# Patient Record
Sex: MALE | Race: WHITE | Employment: OTHER | URBAN - METROPOLITAN AREA
[De-identification: names, ages, dates, MRNs, and addresses within clinical notes are randomized per-mention and may not be internally consistent; named-entity substitution may affect disease eponyms.]

---

## 2021-08-10 ENCOUNTER — HOSPITAL ENCOUNTER (INPATIENT)
Age: 66
LOS: 1 days | Discharge: HOME | DRG: 607 | End: 2021-08-12
Attending: EMERGENCY MEDICINE | Admitting: INTERNAL MEDICINE
Payer: MEDICARE

## 2021-08-10 DIAGNOSIS — R21 RASH AND OTHER NONSPECIFIC SKIN ERUPTION: Primary | ICD-10-CM

## 2021-08-10 DIAGNOSIS — T78.40XA ALLERGIC REACTION TO DRUG, INITIAL ENCOUNTER: ICD-10-CM

## 2021-08-10 LAB
ALBUMIN SERPL-MCNC: 4.1 G/DL (ref 3.5–5.2)
ALP BLD-CCNC: 119 U/L (ref 40–129)
ALT SERPL-CCNC: 29 U/L (ref 0–40)
ANION GAP SERPL CALCULATED.3IONS-SCNC: 9 MMOL/L (ref 7–16)
AST SERPL-CCNC: 22 U/L (ref 0–39)
BASOPHILS ABSOLUTE: 0.04 E9/L (ref 0–0.2)
BASOPHILS RELATIVE PERCENT: 0.5 % (ref 0–2)
BILIRUB SERPL-MCNC: 0.4 MG/DL (ref 0–1.2)
BILIRUBIN URINE: NEGATIVE
BLOOD, URINE: NEGATIVE
BUN BLDV-MCNC: 26 MG/DL (ref 6–23)
CALCIUM SERPL-MCNC: 9.1 MG/DL (ref 8.6–10.2)
CHLORIDE BLD-SCNC: 100 MMOL/L (ref 98–107)
CLARITY: CLEAR
CO2: 29 MMOL/L (ref 22–29)
COLOR: YELLOW
CREAT SERPL-MCNC: 1.1 MG/DL (ref 0.7–1.2)
EOSINOPHILS ABSOLUTE: 0.6 E9/L (ref 0.05–0.5)
EOSINOPHILS RELATIVE PERCENT: 6.8 % (ref 0–6)
GFR AFRICAN AMERICAN: >60
GFR NON-AFRICAN AMERICAN: >60 ML/MIN/1.73
GLUCOSE BLD-MCNC: 101 MG/DL (ref 74–99)
GLUCOSE URINE: NEGATIVE MG/DL
HCT VFR BLD CALC: 46.1 % (ref 37–54)
HEMOGLOBIN: 15.2 G/DL (ref 12.5–16.5)
IMMATURE GRANULOCYTES #: 0.1 E9/L
IMMATURE GRANULOCYTES %: 1.1 % (ref 0–5)
INR BLD: 0.9
KETONES, URINE: NEGATIVE MG/DL
LACTIC ACID: 1 MMOL/L (ref 0.5–2.2)
LEUKOCYTE ESTERASE, URINE: NEGATIVE
LIPASE: 24 U/L (ref 13–60)
LYMPHOCYTES ABSOLUTE: 1.24 E9/L (ref 1.5–4)
LYMPHOCYTES RELATIVE PERCENT: 14.1 % (ref 20–42)
MCH RBC QN AUTO: 32.8 PG (ref 26–35)
MCHC RBC AUTO-ENTMCNC: 33 % (ref 32–34.5)
MCV RBC AUTO: 99.6 FL (ref 80–99.9)
MONOCYTES ABSOLUTE: 0.89 E9/L (ref 0.1–0.95)
MONOCYTES RELATIVE PERCENT: 10.1 % (ref 2–12)
NEUTROPHILS ABSOLUTE: 5.94 E9/L (ref 1.8–7.3)
NEUTROPHILS RELATIVE PERCENT: 67.4 % (ref 43–80)
NITRITE, URINE: NEGATIVE
PDW BLD-RTO: 13.4 FL (ref 11.5–15)
PH UA: 5.5 (ref 5–9)
PLATELET # BLD: 193 E9/L (ref 130–450)
PMV BLD AUTO: 9.7 FL (ref 7–12)
POTASSIUM REFLEX MAGNESIUM: 4.2 MMOL/L (ref 3.5–5)
PROTEIN UA: NEGATIVE MG/DL
PROTHROMBIN TIME: 10.2 SEC (ref 9.3–12.4)
RBC # BLD: 4.63 E12/L (ref 3.8–5.8)
SARS-COV-2, NAAT: NOT DETECTED
SEDIMENTATION RATE, ERYTHROCYTE: 2 MM/HR (ref 0–15)
SODIUM BLD-SCNC: 138 MMOL/L (ref 132–146)
SPECIFIC GRAVITY UA: 1.01 (ref 1–1.03)
TOTAL PROTEIN: 7.1 G/DL (ref 6.4–8.3)
UROBILINOGEN, URINE: 0.2 E.U./DL
WBC # BLD: 8.8 E9/L (ref 4.5–11.5)

## 2021-08-10 PROCEDURE — 96375 TX/PRO/DX INJ NEW DRUG ADDON: CPT

## 2021-08-10 PROCEDURE — 6360000002 HC RX W HCPCS: Performed by: PHYSICIAN ASSISTANT

## 2021-08-10 PROCEDURE — 93005 ELECTROCARDIOGRAM TRACING: CPT | Performed by: EMERGENCY MEDICINE

## 2021-08-10 PROCEDURE — 83690 ASSAY OF LIPASE: CPT

## 2021-08-10 PROCEDURE — G0378 HOSPITAL OBSERVATION PER HR: HCPCS

## 2021-08-10 PROCEDURE — 2500000003 HC RX 250 WO HCPCS: Performed by: PHYSICIAN ASSISTANT

## 2021-08-10 PROCEDURE — 83605 ASSAY OF LACTIC ACID: CPT

## 2021-08-10 PROCEDURE — 81003 URINALYSIS AUTO W/O SCOPE: CPT

## 2021-08-10 PROCEDURE — 87088 URINE BACTERIA CULTURE: CPT

## 2021-08-10 PROCEDURE — 6360000002 HC RX W HCPCS: Performed by: EMERGENCY MEDICINE

## 2021-08-10 PROCEDURE — 99282 EMERGENCY DEPT VISIT SF MDM: CPT

## 2021-08-10 PROCEDURE — 87040 BLOOD CULTURE FOR BACTERIA: CPT

## 2021-08-10 PROCEDURE — 2500000003 HC RX 250 WO HCPCS: Performed by: EMERGENCY MEDICINE

## 2021-08-10 PROCEDURE — 96374 THER/PROPH/DIAG INJ IV PUSH: CPT

## 2021-08-10 PROCEDURE — 96376 TX/PRO/DX INJ SAME DRUG ADON: CPT

## 2021-08-10 PROCEDURE — 80053 COMPREHEN METABOLIC PANEL: CPT

## 2021-08-10 PROCEDURE — 85025 COMPLETE CBC W/AUTO DIFF WBC: CPT

## 2021-08-10 PROCEDURE — 87635 SARS-COV-2 COVID-19 AMP PRB: CPT

## 2021-08-10 PROCEDURE — 85651 RBC SED RATE NONAUTOMATED: CPT

## 2021-08-10 PROCEDURE — 85610 PROTHROMBIN TIME: CPT

## 2021-08-10 RX ORDER — ACETAMINOPHEN 325 MG/1
650 TABLET ORAL EVERY 6 HOURS PRN
Status: DISCONTINUED | OUTPATIENT
Start: 2021-08-10 | End: 2021-08-12 | Stop reason: HOSPADM

## 2021-08-10 RX ORDER — METHYLPREDNISOLONE SODIUM SUCCINATE 125 MG/2ML
60 INJECTION, POWDER, LYOPHILIZED, FOR SOLUTION INTRAMUSCULAR; INTRAVENOUS EVERY 8 HOURS
Status: DISCONTINUED | OUTPATIENT
Start: 2021-08-10 | End: 2021-08-12 | Stop reason: HOSPADM

## 2021-08-10 RX ORDER — IBUPROFEN 200 MG
200 TABLET ORAL EVERY 6 HOURS PRN
COMMUNITY

## 2021-08-10 RX ORDER — POTASSIUM CHLORIDE 7.45 MG/ML
10 INJECTION INTRAVENOUS PRN
Status: DISCONTINUED | OUTPATIENT
Start: 2021-08-10 | End: 2021-08-12 | Stop reason: HOSPADM

## 2021-08-10 RX ORDER — DIPHENHYDRAMINE HYDROCHLORIDE 50 MG/ML
25 INJECTION INTRAMUSCULAR; INTRAVENOUS EVERY 4 HOURS PRN
Status: DISCONTINUED | OUTPATIENT
Start: 2021-08-10 | End: 2021-08-12 | Stop reason: HOSPADM

## 2021-08-10 RX ORDER — METHYLPREDNISOLONE SODIUM SUCCINATE 125 MG/2ML
125 INJECTION, POWDER, LYOPHILIZED, FOR SOLUTION INTRAMUSCULAR; INTRAVENOUS ONCE
Status: COMPLETED | OUTPATIENT
Start: 2021-08-10 | End: 2021-08-10

## 2021-08-10 RX ORDER — TAMSULOSIN HYDROCHLORIDE 0.4 MG/1
0.4 CAPSULE ORAL NIGHTLY
COMMUNITY

## 2021-08-10 RX ORDER — DIPHENHYDRAMINE HYDROCHLORIDE 50 MG/ML
25 INJECTION INTRAMUSCULAR; INTRAVENOUS ONCE
Status: COMPLETED | OUTPATIENT
Start: 2021-08-10 | End: 2021-08-10

## 2021-08-10 RX ORDER — TRAMADOL HYDROCHLORIDE 50 MG/1
50 TABLET ORAL EVERY 6 HOURS PRN
COMMUNITY

## 2021-08-10 RX ORDER — SODIUM CHLORIDE 0.9 % (FLUSH) 0.9 %
10 SYRINGE (ML) INJECTION PRN
Status: DISCONTINUED | OUTPATIENT
Start: 2021-08-10 | End: 2021-08-12 | Stop reason: HOSPADM

## 2021-08-10 RX ORDER — GABAPENTIN 300 MG/1
300 CAPSULE ORAL 4 TIMES DAILY
COMMUNITY

## 2021-08-10 RX ORDER — DULOXETIN HYDROCHLORIDE 30 MG/1
30 CAPSULE, DELAYED RELEASE ORAL DAILY
COMMUNITY

## 2021-08-10 RX ORDER — ACETAMINOPHEN 650 MG/1
650 SUPPOSITORY RECTAL EVERY 6 HOURS PRN
Status: DISCONTINUED | OUTPATIENT
Start: 2021-08-10 | End: 2021-08-12 | Stop reason: HOSPADM

## 2021-08-10 RX ORDER — SODIUM CHLORIDE 0.9 % (FLUSH) 0.9 %
10 SYRINGE (ML) INJECTION EVERY 12 HOURS SCHEDULED
Status: DISCONTINUED | OUTPATIENT
Start: 2021-08-10 | End: 2021-08-12 | Stop reason: HOSPADM

## 2021-08-10 RX ORDER — ASPIRIN 81 MG/1
81 TABLET, CHEWABLE ORAL DAILY
COMMUNITY

## 2021-08-10 RX ORDER — POTASSIUM CHLORIDE 20 MEQ/1
40 TABLET, EXTENDED RELEASE ORAL PRN
Status: DISCONTINUED | OUTPATIENT
Start: 2021-08-10 | End: 2021-08-12 | Stop reason: HOSPADM

## 2021-08-10 RX ORDER — SODIUM CHLORIDE 9 MG/ML
25 INJECTION, SOLUTION INTRAVENOUS PRN
Status: DISCONTINUED | OUTPATIENT
Start: 2021-08-10 | End: 2021-08-12 | Stop reason: HOSPADM

## 2021-08-10 RX ADMIN — FAMOTIDINE 20 MG: 10 INJECTION, SOLUTION INTRAVENOUS at 20:27

## 2021-08-10 RX ADMIN — DIPHENHYDRAMINE HYDROCHLORIDE 25 MG: 50 INJECTION, SOLUTION INTRAMUSCULAR; INTRAVENOUS at 20:27

## 2021-08-10 RX ADMIN — METHYLPREDNISOLONE SODIUM SUCCINATE 125 MG: 125 INJECTION, POWDER, FOR SOLUTION INTRAMUSCULAR; INTRAVENOUS at 20:26

## 2021-08-10 RX ADMIN — METHYLPREDNISOLONE SODIUM SUCCINATE 60 MG: 125 INJECTION, POWDER, LYOPHILIZED, FOR SOLUTION INTRAMUSCULAR; INTRAVENOUS at 23:13

## 2021-08-10 RX ADMIN — DIPHENHYDRAMINE HYDROCHLORIDE 25 MG: 50 INJECTION, SOLUTION INTRAMUSCULAR; INTRAVENOUS at 23:12

## 2021-08-10 RX ADMIN — FAMOTIDINE 20 MG: 10 INJECTION, SOLUTION INTRAVENOUS at 23:13

## 2021-08-10 NOTE — ED PROVIDER NOTES
ED Attending  CC: Mary                                                                                                                                        Department of Emergency Medicine   ED  Provider Note  Admit Date/RoomTime: 8/10/2021  6:31 PM  ED Room: 11/11        HPI:  8/10/21,   Time: 7:08 PM EDT         Samantha Pierre is a 76 y.o. male presenting to the ED for rash, beginning 2 weeks ago. The complaint has been persistent, moderate in severity, and worsened by nothing. The patient states that his rash began on 23 July. He states that he had just been started on a medication called Lamisil. He followed up with his PCP afterward and states they advised him to stop the medication. He was given a shot of steroids and sent home with a course of prednisone. Patient states that he got no relief. He states that he was was seen today by the dermatologist office. The patient states that a biopsy was obtained but that he was advised to come here to the emergency room. The patient generally feels well otherwise. The rash is very itchy though none of the medications they have given of helped. He states the only thing he is taking right now is the Allegra and Benadryl. He reports that there is no other new soaps lotions or other products that he is putting on topically. He denies any out of the ordinary food items. No one else has any rash at home. The patient does not have any swelling in his lips mouth or throat. He denies any shortness of breath or cough. No fever or chills reported. He states that he was chopping down a lot of pine trees at home and wonders whether that might have anything to do with it. He states that the round of steroids he had afterward however did not help. He reports that 20 years ago he had a similar rash and ultimately ended up at the [facility]. He discovered at that time he was allergic to several topical agents/soap.    States he has to use products that are perfume and color free. ROS:     Constitutional: Negative for fever and chills  HENT: Negative for ear pain, sore throat and sinus pressure  Eyes: Negative for pain, discharge and redness  Respiratory:  Negative for shortness of breath, cough and wheezing  Cardiovascular: Negative for CP, edema or palpitations  Gastrointestinal: Negative for nausea, vomiting, diarrhea and abdominal distention  Genitourinary: Negative for dysuria and frequency  Musculoskeletal: Negative for back pain and arthralgia  Skin:  See HPI  Neurological: Negative for weakness and headaches  Hematological: Negative for adenopathy    All other systems reviewed and are negative      -------------------------------- PAST HISTORY ----------------------------------  Past Medical History:  has no past medical history on file. Past Surgical History:  has no past surgical history on file. Social History:      Family History: family history is not on file. The patients home medications have been reviewed. Allergies: Patient has no known allergies.     --------------------------------- RESULTS ------------------------------------------  All laboratory and radiology results have been personally reviewed by myself   LABS:  Results for orders placed or performed during the hospital encounter of 08/10/21   COVID-19, Rapid    Specimen: Nasopharyngeal Swab   Result Value Ref Range    SARS-CoV-2, NAAT Not Detected Not Detected   CBC Auto Differential   Result Value Ref Range    WBC 8.8 4.5 - 11.5 E9/L    RBC 4.63 3.80 - 5.80 E12/L    Hemoglobin 15.2 12.5 - 16.5 g/dL    Hematocrit 46.1 37.0 - 54.0 %    MCV 99.6 80.0 - 99.9 fL    MCH 32.8 26.0 - 35.0 pg    MCHC 33.0 32.0 - 34.5 %    RDW 13.4 11.5 - 15.0 fL    Platelets 814 772 - 163 E9/L    MPV 9.7 7.0 - 12.0 fL    Neutrophils % 67.4 43.0 - 80.0 %    Immature Granulocytes % 1.1 0.0 - 5.0 %    Lymphocytes % 14.1 (L) 20.0 - 42.0 %    Monocytes % 10.1 2.0 - 12.0 %    Eosinophils % 6.8 (H) 0.0 - 6.0 % Basophils % 0.5 0.0 - 2.0 %    Neutrophils Absolute 5.94 1.80 - 7.30 E9/L    Immature Granulocytes # 0.10 E9/L    Lymphocytes Absolute 1.24 (L) 1.50 - 4.00 E9/L    Monocytes Absolute 0.89 0.10 - 0.95 E9/L    Eosinophils Absolute 0.60 (H) 0.05 - 0.50 E9/L    Basophils Absolute 0.04 0.00 - 0.20 E9/L   Comprehensive Metabolic Panel w/ Reflex to MG   Result Value Ref Range    Sodium 138 132 - 146 mmol/L    Potassium reflex Magnesium 4.2 3.5 - 5.0 mmol/L    Chloride 100 98 - 107 mmol/L    CO2 29 22 - 29 mmol/L    Anion Gap 9 7 - 16 mmol/L    Glucose 101 (H) 74 - 99 mg/dL    BUN 26 (H) 6 - 23 mg/dL    CREATININE 1.1 0.7 - 1.2 mg/dL    GFR Non-African American >60 >=60 mL/min/1.73    GFR African American >60     Calcium 9.1 8.6 - 10.2 mg/dL    Total Protein 7.1 6.4 - 8.3 g/dL    Albumin 4.1 3.5 - 5.2 g/dL    Total Bilirubin 0.4 0.0 - 1.2 mg/dL    Alkaline Phosphatase 119 40 - 129 U/L    ALT 29 0 - 40 U/L    AST 22 0 - 39 U/L   Lactic Acid, Plasma   Result Value Ref Range    Lactic Acid 1.0 0.5 - 2.2 mmol/L   Lipase   Result Value Ref Range    Lipase 24 13 - 60 U/L   Urinalysis, reflex to microscopic   Result Value Ref Range    Color, UA Yellow Straw/Yellow    Clarity, UA Clear Clear    Glucose, Ur Negative Negative mg/dL    Bilirubin Urine Negative Negative    Ketones, Urine Negative Negative mg/dL    Specific Gravity, UA 1.015 1.005 - 1.030    Blood, Urine Negative Negative    pH, UA 5.5 5.0 - 9.0    Protein, UA Negative Negative mg/dL    Urobilinogen, Urine 0.2 <2.0 E.U./dL    Nitrite, Urine Negative Negative    Leukocyte Esterase, Urine Negative Negative   Protime-INR   Result Value Ref Range    Protime 10.2 9.3 - 12.4 sec    INR 0.9    EKG 12 Lead   Result Value Ref Range    Ventricular Rate 80 BPM    Atrial Rate 80 BPM    P-R Interval 146 ms    QRS Duration 106 ms    Q-T Interval 378 ms    QTc Calculation (Bazett) 435 ms    P Axis 65 degrees    R Axis 13 degrees    T Axis 17 degrees       RADIOLOGY:  Interpreted by Radiologist.  No orders to display       ----------------- NURSING NOTES AND VITALS REVIEWED ---------------   The nursing notes within the ED encounter and vital signs as below have been reviewed. /77   Pulse 103   Temp 96.1 °F (35.6 °C)   Resp 16   Ht 6' 3\" (1.905 m)   Wt 270 lb (122.5 kg)   SpO2 97%   BMI 33.75 kg/m²   Oxygen Saturation Interpretation: Normal      --------------------------------PHYSICAL EXAM------------------------------------      Constitutional/General: Alert and oriented x3, well appearing, non toxic in NAD  Head: NC/AT  Eyes: PERRL, EOMI  Mouth: Oropharynx clear, handling secretions, no trismus  Neck: Supple, full ROM, no meningeal signs  Pulmonary: Lungs clear to auscultation bilaterally, no wheezes, rales, or rhonchi. Not in respiratory distress  Cardiovascular:  Regular rate and rhythm, no murmurs, gallops, or rubs. 2+ distal pulses  Abdomen: Soft, + BS. No distension. Nontender. No palpable rigidity, rebound or guarding  Extremities: Moves all extremities x 4. Warm and well perfused  Skin: See pictures. Blanching confluent rash seen over chest, back and both arms. Neurologic: GCS 15,  Intact. No focal deficits  Psych: Normal Affect                  ------------------------ ED COURSE/MEDICAL DECISION MAKING----------------------  Medications   methylPREDNISolone sodium (SOLU-MEDROL) injection 125 mg (125 mg Intravenous Given 8/10/21 2026)   diphenhydrAMINE (BENADRYL) injection 25 mg (25 mg Intravenous Given 8/10/21 2027)   famotidine (PEPCID) injection 20 mg (20 mg Intravenous Given 8/10/21 2027)         Medical Decision Making:    Pt looks great here. No swelling noted in lips, mouth or posterior pharynx. Lungs clear. Discussed patient with dermatology midlevel and dermatologist.   Concern for Erythroderma or erythema multiforme???   Given Solumedrol now. Plan close observation overnight for airway issues.     Seen and evaluated with Dr. Nash Betancourt Counseling: The emergency provider has spoken with the patient and spouse/SO and discussed todays results, in addition to providing specific details for the plan of care and counseling regarding the diagnosis and prognosis. Questions are answered at this time and they are agreeable with the plan.      ------------------------ IMPRESSION AND DISPOSITION -------------------------------    IMPRESSION  1. Rash and other nonspecific skin eruption    2.  Allergic reaction to drug, initial encounter        DISPOSITION  Disposition: Admit to telemetry  Patient condition is stable                   Natalya Kiser PA-C  08/10/21 [id number]

## 2021-08-11 LAB
ANION GAP SERPL CALCULATED.3IONS-SCNC: 10 MMOL/L (ref 7–16)
BASOPHILS ABSOLUTE: 0.02 E9/L (ref 0–0.2)
BASOPHILS RELATIVE PERCENT: 0.3 % (ref 0–2)
BUN BLDV-MCNC: 23 MG/DL (ref 6–23)
CALCIUM SERPL-MCNC: 8.9 MG/DL (ref 8.6–10.2)
CHLORIDE BLD-SCNC: 101 MMOL/L (ref 98–107)
CO2: 26 MMOL/L (ref 22–29)
CREAT SERPL-MCNC: 1 MG/DL (ref 0.7–1.2)
EKG ATRIAL RATE: 80 BPM
EKG P AXIS: 65 DEGREES
EKG P-R INTERVAL: 146 MS
EKG Q-T INTERVAL: 378 MS
EKG QRS DURATION: 106 MS
EKG QTC CALCULATION (BAZETT): 435 MS
EKG R AXIS: 13 DEGREES
EKG T AXIS: 17 DEGREES
EKG VENTRICULAR RATE: 80 BPM
EOSINOPHILS ABSOLUTE: 0.01 E9/L (ref 0.05–0.5)
EOSINOPHILS RELATIVE PERCENT: 0.1 % (ref 0–6)
GFR AFRICAN AMERICAN: >60
GFR NON-AFRICAN AMERICAN: >60 ML/MIN/1.73
GLUCOSE BLD-MCNC: 264 MG/DL (ref 74–99)
HCT VFR BLD CALC: 43.9 % (ref 37–54)
HEMOGLOBIN: 14.9 G/DL (ref 12.5–16.5)
IMMATURE GRANULOCYTES #: 0.06 E9/L
IMMATURE GRANULOCYTES %: 0.9 % (ref 0–5)
LYMPHOCYTES ABSOLUTE: 0.36 E9/L (ref 1.5–4)
LYMPHOCYTES RELATIVE PERCENT: 5.1 % (ref 20–42)
MCH RBC QN AUTO: 33.7 PG (ref 26–35)
MCHC RBC AUTO-ENTMCNC: 33.9 % (ref 32–34.5)
MCV RBC AUTO: 99.3 FL (ref 80–99.9)
METER GLUCOSE: 146 MG/DL (ref 74–99)
METER GLUCOSE: 187 MG/DL (ref 74–99)
METER GLUCOSE: 303 MG/DL (ref 74–99)
MONOCYTES ABSOLUTE: 0.08 E9/L (ref 0.1–0.95)
MONOCYTES RELATIVE PERCENT: 1.1 % (ref 2–12)
NEUTROPHILS ABSOLUTE: 6.48 E9/L (ref 1.8–7.3)
NEUTROPHILS RELATIVE PERCENT: 92.5 % (ref 43–80)
PDW BLD-RTO: 13.4 FL (ref 11.5–15)
PLATELET # BLD: 190 E9/L (ref 130–450)
PMV BLD AUTO: 9.7 FL (ref 7–12)
POTASSIUM REFLEX MAGNESIUM: 4.6 MMOL/L (ref 3.5–5)
RBC # BLD: 4.42 E12/L (ref 3.8–5.8)
SODIUM BLD-SCNC: 137 MMOL/L (ref 132–146)
WBC # BLD: 7 E9/L (ref 4.5–11.5)

## 2021-08-11 PROCEDURE — 2580000003 HC RX 258: Performed by: PHYSICIAN ASSISTANT

## 2021-08-11 PROCEDURE — 80048 BASIC METABOLIC PNL TOTAL CA: CPT

## 2021-08-11 PROCEDURE — 96376 TX/PRO/DX INJ SAME DRUG ADON: CPT

## 2021-08-11 PROCEDURE — 6370000000 HC RX 637 (ALT 250 FOR IP): Performed by: INTERNAL MEDICINE

## 2021-08-11 PROCEDURE — 93010 ELECTROCARDIOGRAM REPORT: CPT | Performed by: INTERNAL MEDICINE

## 2021-08-11 PROCEDURE — 96372 THER/PROPH/DIAG INJ SC/IM: CPT

## 2021-08-11 PROCEDURE — 6360000002 HC RX W HCPCS: Performed by: PHYSICIAN ASSISTANT

## 2021-08-11 PROCEDURE — 36415 COLL VENOUS BLD VENIPUNCTURE: CPT

## 2021-08-11 PROCEDURE — 2500000003 HC RX 250 WO HCPCS: Performed by: PHYSICIAN ASSISTANT

## 2021-08-11 PROCEDURE — 2060000000 HC ICU INTERMEDIATE R&B

## 2021-08-11 PROCEDURE — 82962 GLUCOSE BLOOD TEST: CPT

## 2021-08-11 PROCEDURE — 6370000000 HC RX 637 (ALT 250 FOR IP): Performed by: PHYSICIAN ASSISTANT

## 2021-08-11 PROCEDURE — 96375 TX/PRO/DX INJ NEW DRUG ADDON: CPT

## 2021-08-11 PROCEDURE — 6360000002 HC RX W HCPCS: Performed by: INTERNAL MEDICINE

## 2021-08-11 PROCEDURE — 85025 COMPLETE CBC W/AUTO DIFF WBC: CPT

## 2021-08-11 RX ORDER — HYDRALAZINE HYDROCHLORIDE 20 MG/ML
10 INJECTION INTRAMUSCULAR; INTRAVENOUS EVERY 6 HOURS PRN
Status: DISCONTINUED | OUTPATIENT
Start: 2021-08-11 | End: 2021-08-12 | Stop reason: HOSPADM

## 2021-08-11 RX ORDER — ASPIRIN 81 MG/1
81 TABLET, CHEWABLE ORAL DAILY
Status: DISCONTINUED | OUTPATIENT
Start: 2021-08-11 | End: 2021-08-12 | Stop reason: HOSPADM

## 2021-08-11 RX ORDER — NICOTINE POLACRILEX 4 MG
15 LOZENGE BUCCAL PRN
Status: DISCONTINUED | OUTPATIENT
Start: 2021-08-11 | End: 2021-08-12 | Stop reason: HOSPADM

## 2021-08-11 RX ORDER — DIPHENHYDRAMINE HYDROCHLORIDE 50 MG/ML
25 INJECTION INTRAMUSCULAR; INTRAVENOUS EVERY 8 HOURS
Status: DISCONTINUED | OUTPATIENT
Start: 2021-08-11 | End: 2021-08-12 | Stop reason: HOSPADM

## 2021-08-11 RX ORDER — GABAPENTIN 300 MG/1
300 CAPSULE ORAL 4 TIMES DAILY
Status: DISCONTINUED | OUTPATIENT
Start: 2021-08-11 | End: 2021-08-12 | Stop reason: HOSPADM

## 2021-08-11 RX ORDER — TAMSULOSIN HYDROCHLORIDE 0.4 MG/1
0.4 CAPSULE ORAL DAILY
Status: DISCONTINUED | OUTPATIENT
Start: 2021-08-11 | End: 2021-08-12 | Stop reason: HOSPADM

## 2021-08-11 RX ORDER — TRAMADOL HYDROCHLORIDE 50 MG/1
50 TABLET ORAL EVERY 6 HOURS PRN
Status: DISCONTINUED | OUTPATIENT
Start: 2021-08-11 | End: 2021-08-12 | Stop reason: HOSPADM

## 2021-08-11 RX ORDER — AMLODIPINE BESYLATE 5 MG/1
5 TABLET ORAL DAILY
Status: DISCONTINUED | OUTPATIENT
Start: 2021-08-11 | End: 2021-08-12 | Stop reason: HOSPADM

## 2021-08-11 RX ORDER — DEXTROSE MONOHYDRATE 50 MG/ML
100 INJECTION, SOLUTION INTRAVENOUS PRN
Status: DISCONTINUED | OUTPATIENT
Start: 2021-08-11 | End: 2021-08-12 | Stop reason: HOSPADM

## 2021-08-11 RX ORDER — IBUPROFEN 200 MG
200 TABLET ORAL EVERY 6 HOURS PRN
Status: DISCONTINUED | OUTPATIENT
Start: 2021-08-11 | End: 2021-08-12 | Stop reason: HOSPADM

## 2021-08-11 RX ORDER — DEXTROSE MONOHYDRATE 25 G/50ML
12.5 INJECTION, SOLUTION INTRAVENOUS PRN
Status: DISCONTINUED | OUTPATIENT
Start: 2021-08-11 | End: 2021-08-12 | Stop reason: HOSPADM

## 2021-08-11 RX ORDER — DULOXETIN HYDROCHLORIDE 30 MG/1
30 CAPSULE, DELAYED RELEASE ORAL DAILY
Status: DISCONTINUED | OUTPATIENT
Start: 2021-08-11 | End: 2021-08-12 | Stop reason: HOSPADM

## 2021-08-11 RX ADMIN — INSULIN LISPRO 1 UNITS: 100 INJECTION, SOLUTION INTRAVENOUS; SUBCUTANEOUS at 16:11

## 2021-08-11 RX ADMIN — TAMSULOSIN HYDROCHLORIDE 0.4 MG: 0.4 CAPSULE ORAL at 23:54

## 2021-08-11 RX ADMIN — DIPHENHYDRAMINE HYDROCHLORIDE 25 MG: 50 INJECTION, SOLUTION INTRAMUSCULAR; INTRAVENOUS at 15:56

## 2021-08-11 RX ADMIN — HYDRALAZINE HYDROCHLORIDE 10 MG: 20 INJECTION INTRAMUSCULAR; INTRAVENOUS at 06:35

## 2021-08-11 RX ADMIN — GABAPENTIN 300 MG: 300 CAPSULE ORAL at 16:30

## 2021-08-11 RX ADMIN — INSULIN LISPRO 1 UNITS: 100 INJECTION, SOLUTION INTRAVENOUS; SUBCUTANEOUS at 11:40

## 2021-08-11 RX ADMIN — DULOXETINE 30 MG: 30 CAPSULE, DELAYED RELEASE ORAL at 10:10

## 2021-08-11 RX ADMIN — DIPHENHYDRAMINE HYDROCHLORIDE 25 MG: 50 INJECTION, SOLUTION INTRAMUSCULAR; INTRAVENOUS at 23:54

## 2021-08-11 RX ADMIN — FAMOTIDINE 20 MG: 10 INJECTION, SOLUTION INTRAVENOUS at 20:33

## 2021-08-11 RX ADMIN — INSULIN LISPRO 2 UNITS: 100 INJECTION, SOLUTION INTRAVENOUS; SUBCUTANEOUS at 20:33

## 2021-08-11 RX ADMIN — FAMOTIDINE 20 MG: 10 INJECTION, SOLUTION INTRAVENOUS at 09:00

## 2021-08-11 RX ADMIN — Medication 10 ML: at 20:34

## 2021-08-11 RX ADMIN — GABAPENTIN 300 MG: 300 CAPSULE ORAL at 00:46

## 2021-08-11 RX ADMIN — GABAPENTIN 300 MG: 300 CAPSULE ORAL at 20:33

## 2021-08-11 RX ADMIN — Medication 10 ML: at 09:00

## 2021-08-11 RX ADMIN — GABAPENTIN 300 MG: 300 CAPSULE ORAL at 09:00

## 2021-08-11 RX ADMIN — ENOXAPARIN SODIUM 40 MG: 40 INJECTION SUBCUTANEOUS at 09:00

## 2021-08-11 RX ADMIN — DIPHENHYDRAMINE HYDROCHLORIDE 25 MG: 50 INJECTION, SOLUTION INTRAMUSCULAR; INTRAVENOUS at 06:35

## 2021-08-11 RX ADMIN — TRAMADOL HYDROCHLORIDE 50 MG: 50 TABLET, FILM COATED ORAL at 11:27

## 2021-08-11 RX ADMIN — GABAPENTIN 300 MG: 300 CAPSULE ORAL at 13:07

## 2021-08-11 RX ADMIN — METHYLPREDNISOLONE SODIUM SUCCINATE 60 MG: 125 INJECTION, POWDER, LYOPHILIZED, FOR SOLUTION INTRAMUSCULAR; INTRAVENOUS at 15:50

## 2021-08-11 RX ADMIN — HYDRALAZINE HYDROCHLORIDE 10 MG: 20 INJECTION INTRAMUSCULAR; INTRAVENOUS at 11:42

## 2021-08-11 RX ADMIN — DIPHENHYDRAMINE HYDROCHLORIDE 25 MG: 50 INJECTION, SOLUTION INTRAMUSCULAR; INTRAVENOUS at 11:32

## 2021-08-11 RX ADMIN — AMLODIPINE BESYLATE 5 MG: 5 TABLET ORAL at 16:00

## 2021-08-11 RX ADMIN — METHYLPREDNISOLONE SODIUM SUCCINATE 60 MG: 125 INJECTION, POWDER, LYOPHILIZED, FOR SOLUTION INTRAMUSCULAR; INTRAVENOUS at 06:35

## 2021-08-11 RX ADMIN — METHYLPREDNISOLONE SODIUM SUCCINATE 60 MG: 125 INJECTION, POWDER, LYOPHILIZED, FOR SOLUTION INTRAMUSCULAR; INTRAVENOUS at 23:54

## 2021-08-11 ASSESSMENT — PAIN SCALES - GENERAL
PAINLEVEL_OUTOF10: 6
PAINLEVEL_OUTOF10: 5
PAINLEVEL_OUTOF10: 3
PAINLEVEL_OUTOF10: 4

## 2021-08-11 ASSESSMENT — PAIN DESCRIPTION - DESCRIPTORS: DESCRIPTORS: DISCOMFORT

## 2021-08-11 ASSESSMENT — PAIN DESCRIPTION - LOCATION: LOCATION: GENERALIZED

## 2021-08-11 ASSESSMENT — PAIN - FUNCTIONAL ASSESSMENT: PAIN_FUNCTIONAL_ASSESSMENT: ACTIVITIES ARE NOT PREVENTED

## 2021-08-11 ASSESSMENT — PAIN DESCRIPTION - PAIN TYPE: TYPE: ACUTE PAIN

## 2021-08-11 NOTE — PROGRESS NOTES
THIS RN CALLED [facility] COVER[facility] ELEV[facility] BP AND HOME MEDICATIONS THAT WERE NOT CONTINUED. NEW ORDERS RECEIVED AND ENTERED INTO THE SYSTEM.

## 2021-08-11 NOTE — H&P
History and Physical      CHIEF COMPLAINT: Itching and rash    History of Present Illness: 69-year-old male patient of Dr. Laith Calvo I am asked to admit and follow. History obtained from patient and wife as well as electronic record. Patient was referred to the hospital by dermatology. He states he had a routine follow-up for his facial/ labial rosacea; and he questioned dermatology regarding skin rash seen below. He had biopsy done and referred to the ED.  --Patient had arthroscopic right knee surgery 3 weeks prior to this; had tourniquet wrapped around thigh as pictured below. --He was started on Lamisil by PCP due to patient complaining of toenail difficulties approximately 3 weeks ago. Rash began on 7/23/2021. Medication was stopped. He was given a steroid shot, started on oral prednisone 10 mg daily and sent home. There was no significant relief, actually worsening until yesterday. --3 weeks prior he was working in the yard cutting blue spruce trees. Cuttings sat in the back of his truck for 2 to 3 days until he was able to remove them; significant SAP pollen and exposure to grasses and weeds as he unloaded the clippings. --He was treated 2001 [facility] for similar rash, he does not recall any specific diagnosis; possible atopic dermatitis. There was no significant improvement with treatment, if simply had run its course  --He does get frequent skin issues, has been receiving steroid shot each spring and fall which seems to keep most of the rash under control  --Denies any significant head congestion eye itching sinus or pulmonary difficulties when rash occurs  --History of psoriatic arthritis, trial of Simponi earlier this year with adverse reaction x2 despite \"pretreatment\" before second injection. Currently on Enbrel weekly. --Patient was found to have a heart murmur 1971; underwent cardiac catheterization and was told by cardiologist \"probably had rheumatic fever\" as a child.   He has had no 2D echo follow-up. --Past history negative for scarlet fever polio diphtheria cancer. --History of borderline diabetes; had been on medication a few years ago intentional weight loss all medicines were stopped for diabetes. --Began smoking age 14, approximate 1 pack a day quit 1997  --No other known cardiac pulmonary GI  difficulties. --Psoriasis for many years; psoriatic arthritis recently                     Past Medical History:   Diagnosis Date    Allergic drug reaction 08/10/2021    BPH (benign prostatic hyperplasia)     [facility]ylosis [facility]    H/O: rheumatic fever 1964    [facility] and heart cath    Herniated thoracic disc without myelopathy 2016    [facility]; T3-4, LEFT    [facility] of [facility]opic dermatitis 2001    [facility]    Psoriasis 1995    Psoriatic arthritis ([address]     Started Simponi then Enbrel 2021    Rheumatic mitral regurgitation     Rosacea          Past Surgical History:   Procedure Laterality Date    BACK SURGERY  01/2021 january 2021    COLONOSCOPY  09/16/2004    KNEE ARTHROSCOPY Right 07/2021       Medications Prior to Admission:    Medications Prior to Admission: tamsulosin (FLOMAX) 0.4 MG capsule, Take 0.4 mg by mouth nightly  gabapentin (NEURONTIN) 300 MG capsule, Take 300 mg by mouth 4 times daily. DULoxetine (CYMBALTA) 30 MG extended release capsule, Take 30 mg by mouth daily  traMADol (ULTRAM) 50 MG tablet, Take 50 mg by mouth every 6 hours as needed for Pain. aspirin 81 MG chewable tablet, Take 81 mg by mouth daily  ibuprofen (ADVIL;MOTRIN) 200 MG tablet, Take 200 mg by mouth every 6 hours as needed for Pain  etanercept (ENBREL) 50 MG/ML injection, Inject 25 mg into the skin Twice a Week ON FRIDAYS    Allergies:    Simponi [golimumab]    Social History:    reports that he quit smoking about 24 years ago. His smoking use included cigarettes. He started smoking about 54 years ago. He has a 30.00 pack-year smoking history.  He has never used smokeless tobacco. He reports current alcohol use of about 3.0 standard drinks of alcohol per week. He reports that he does not use drugs. Family History:   family history includes Diabetes in his father and mother; Other in his mother. REVIEW OF SYSTEMS:  As above in the HPI, otherwise negative    PHYSICAL EXAM:    VS: BP (!) 178/75   Pulse 105   Temp 98.7 °F (37.1 °C) (Oral)   Resp 18   Ht 6' 3\" (1.905 m)   Wt 277 lb 9.6 oz (125.9 kg)   SpO2 100%   BMI 34.70 kg/m²     General appearance: Alert, Awake, Oriented times 3, no distress; uncomfortable because of pruritus  Skin: Warm and dry ; rash as above  Head: Normocephalic. No masses, lesions or tenderness noted  Eyes: Conjunctivae pink, sclera white. PERRL,EOM-I  Ears: External ears normal  Nose/Sinuses: Nares normal. Septum midline. Mucosa normal. No drainage  Oropharynx: Oropharynx clear with no exudate seen  Neck: Supple. No jugular venous distension, lymphadenopathy or thyromegaly Trachea midline  Lungs: Clear to auscultation bilaterally. No rhonchi, crackles or wheezes  Heart: S1 S2  Regular rate and rhythm. No rub or gallop; grade 1/6 systolic murmur third fourth left intercostal space  Abdomen: Soft, non-tender. BS normal. No masses, organomegaly; no rebound or guarding; skin biopsy site left flank  Extremities: No edema, Peripheral pulses palpable  Musculoskeletal: Muscular strength appears intact. Neuro:  No focal motor defects ; II-XII grossly intact .  GUTIERREZ equally  Breast: deferred  Rectal: deferred  Genitalia:  deferred    LABS:  CBC:   Lab Results   Component Value Date    WBC 7.0 08/11/2021    RBC 4.42 08/11/2021    HGB 14.9 08/11/2021    HCT 43.9 08/11/2021    MCV 99.3 08/11/2021    MCH 33.7 08/11/2021    MCHC 33.9 08/11/2021    RDW 13.4 08/11/2021     08/11/2021    MPV 9.7 08/11/2021     CBC with Differential:    Lab Results   Component Value Date    WBC 7.0 08/11/2021    RBC 4.42 08/11/2021    HGB 14.9 08/11/2021    HCT 43.9 08/11/2021     08/11/2021    MCV 99.3 08/11/2021    MCH 33.7 08/11/2021    MCHC 33.9 08/11/2021    RDW 13.4 08/11/2021    LYMPHOPCT 5.1 08/11/2021    MONOPCT 1.1 08/11/2021    BASOPCT 0.3 08/11/2021    MONOSABS 0.08 08/11/2021    LYMPHSABS 0.36 08/11/2021    EOSABS 0.01 08/11/2021    BASOSABS 0.02 08/11/2021     Hemoglobin/Hematocrit:    Lab Results   Component Value Date    HGB 14.9 08/11/2021    HCT 43.9 08/11/2021     CMP:    Lab Results   Component Value Date     08/11/2021    K 4.6 08/11/2021     08/11/2021    CO2 26 08/11/2021    BUN 23 08/11/2021    CREATININE 1.0 08/11/2021    GFRAA >60 08/11/2021    LABGLOM >60 08/11/2021    GLUCOSE 264 08/11/2021    PROT 7.1 08/10/2021    LABALBU 4.1 08/10/2021    CALCIUM 8.9 08/11/2021    BILITOT 0.4 08/10/2021    ALKPHOS 119 08/10/2021    AST 22 08/10/2021    ALT 29 08/10/2021     BMP:    Lab Results   Component Value Date     08/11/2021    K 4.6 08/11/2021     08/11/2021    CO2 26 08/11/2021    BUN 23 08/11/2021    LABALBU 4.1 08/10/2021    CREATININE 1.0 08/11/2021    CALCIUM 8.9 08/11/2021    GFRAA >60 08/11/2021    LABGLOM >60 08/11/2021    GLUCOSE 264 08/11/2021     Hepatic Function Panel:    Lab Results   Component Value Date    ALKPHOS 119 08/10/2021    ALT 29 08/10/2021    AST 22 08/10/2021    PROT 7.1 08/10/2021    BILITOT 0.4 08/10/2021    LABALBU 4.1 08/10/2021     Ionized Calcium:  No results found for: IONCA  Magnesium:  No results found for: MG  Phosphorus:  No results found for: PHOS  LDH:  No results found for: LDH  Uric Acid:  No results found for: LABURIC, URICACID  PT/INR:    Lab Results   Component Value Date    PROTIME 10.2 08/10/2021    INR 0.9 08/10/2021     Warfarin PT/INR:  No components found for: PTPATWAR, PTINRWAR  PTT:  No results found for: APTT, PTT[APTT}  Troponin:  No results found for: TROPONINI  Last 3 Troponin:  No results found for: TROPONINI  U/A:    Lab Results   Component Value Date    COLORU Yellow 08/10/2021    PROTEINU Negative 08/10/2021    PHUR 5.5 08/10/2021    CLARITYU Clear 08/10/2021    SPECGRAV 1.015 08/10/2021    LEUKOCYTESUR Negative 08/10/2021    UROBILINOGEN 0.2 08/10/2021    BILIRUBINUR Negative 08/10/2021    BLOODU Negative 08/10/2021    GLUCOSEU Negative 08/10/2021     HgBA1c:  No results found for: LABA1C  FLP:  No results found for: TRIG, HDL, LDLCALC, LDLDIRECT, LABVLDL  TSH:  No results found for: TSH  VITAMIN B12: No components found for: B12  FOLATE:  No results found for: FOLATE  IRON:  No results found for: IRON  Iron Saturation:  No components found for: PERCENTFE  TIBC:  No results found for: TIBC  FERRITIN:  No results found for: FERRITIN  DIDI:  No results found for: ANATITER, DIDI  RF:  No results found for: RF    RADIOLOGY:  No orders to display       ASSESSMENT:      Active Hospital Problems    Diagnosis     BPH (benign prostatic hyperplasia) [N40.0]     Rosacea [L71.9]     Psoriatic arthritis ([address] [L40.50]     Rheumatic mitral regurgitation [I05.1]     Allergic drug reaction [T78.40XA]     Cervical spondylosis [M47.812]     Herniated thoracic disc without myelopathy [M51.24]     History of atopic dermatitis [Z87.2]     Psoriasis [L40.9]        PLAN:  Medications discussed with patient  GI prophylaxis  DVT prophylaxis  Dermatology consult  Diphenhydramine 25 mg 3 times daily  Famotidine 20 mg IV twice daily  Sliding-scale insulin  Methylprednisolone 60 mg IV every 8 hours  Tamsulosin 0.4 mg bedtime  Monitor labs  Procalcitonin  A1c  2D echo        Please note that over 50 minutes was spent in evaluating the patient, review of records and results, discussion with staff/family, etc.    [address]  DO  2:58 PM  8/11/2021    Voice recognition software use for dictation

## 2021-08-11 NOTE — PROGRESS NOTES
Physical Therapy    Facility/Department: [address] INTERNAL MEDICINE 2      NAME: Damien Sebastian  : 1955  MRN: [id number]    Date of Service: 2021     Order received for PT evaluation. Pt independent, no PT needs at this time.    [facility] PSYCHIATRY PT [phone]

## 2021-08-11 NOTE — CARE COORDINATION
Met w/ patient. Explained role of  and plan of care. Lives with his wife in a 1 story house- 2 steps to entrance. Has walker and portable ramp to entrance to home but does not use. Hx [facility] approx 20 years ago for similar rash. PCP is Dr. Saúl Carnes and pharmacy is [facility]. Currently on iv steroid. Per pt, plan is to return home on discharge- no needs- states his wife will provide transport home.  Will follow Nestor Luna RN case manager

## 2021-08-12 VITALS
HEIGHT: 75 IN | WEIGHT: 284 LBS | SYSTOLIC BLOOD PRESSURE: 154 MMHG | HEART RATE: 93 BPM | DIASTOLIC BLOOD PRESSURE: 66 MMHG | RESPIRATION RATE: 18 BRPM | BODY MASS INDEX: 35.31 KG/M2 | OXYGEN SATURATION: 95 % | TEMPERATURE: 97.4 F

## 2021-08-12 PROBLEM — I27.20 PULMONARY HYPERTENSION (HCC): Status: ACTIVE | Noted: 2021-08-12

## 2021-08-12 LAB
ALBUMIN SERPL-MCNC: 4 G/DL (ref 3.5–5.2)
ALP BLD-CCNC: 117 U/L (ref 40–129)
ALT SERPL-CCNC: 28 U/L (ref 0–40)
ANION GAP SERPL CALCULATED.3IONS-SCNC: 10 MMOL/L (ref 7–16)
AST SERPL-CCNC: 20 U/L (ref 0–39)
BASOPHILS ABSOLUTE: 0.02 E9/L (ref 0–0.2)
BASOPHILS RELATIVE PERCENT: 0.1 % (ref 0–2)
BILIRUB SERPL-MCNC: 0.4 MG/DL (ref 0–1.2)
BILIRUBIN DIRECT: <0.2 MG/DL (ref 0–0.3)
BILIRUBIN, INDIRECT: NORMAL MG/DL (ref 0–1)
BUN BLDV-MCNC: 27 MG/DL (ref 6–23)
C-REACTIVE PROTEIN: 0.3 MG/DL (ref 0–0.4)
CALCIUM IONIZED: 1.29 MMOL/L (ref 1.15–1.33)
CALCIUM SERPL-MCNC: 9.2 MG/DL (ref 8.6–10.2)
CHLORIDE BLD-SCNC: 100 MMOL/L (ref 98–107)
CHOLESTEROL, TOTAL: 180 MG/DL (ref 0–199)
CO2: 27 MMOL/L (ref 22–29)
CREAT SERPL-MCNC: 1 MG/DL (ref 0.7–1.2)
EOSINOPHILS ABSOLUTE: 0 E9/L (ref 0.05–0.5)
EOSINOPHILS RELATIVE PERCENT: 0 % (ref 0–6)
FOLATE: 9.6 NG/ML (ref 4.8–24.2)
GFR AFRICAN AMERICAN: >60
GFR NON-AFRICAN AMERICAN: >60 ML/MIN/1.73
GLUCOSE BLD-MCNC: 204 MG/DL (ref 74–99)
HBA1C MFR BLD: 6 % (ref 4–5.6)
HCT VFR BLD CALC: 41.4 % (ref 37–54)
HDLC SERPL-MCNC: 54 MG/DL
HEMOGLOBIN: 13.8 G/DL (ref 12.5–16.5)
IMMATURE GRANULOCYTES #: 0.15 E9/L
IMMATURE GRANULOCYTES %: 1.1 % (ref 0–5)
LDL CHOLESTEROL CALCULATED: 109 MG/DL (ref 0–99)
LV EF: 65 %
LVEF MODALITY: NORMAL
LYMPHOCYTES ABSOLUTE: 0.62 E9/L (ref 1.5–4)
LYMPHOCYTES RELATIVE PERCENT: 4.4 % (ref 20–42)
MAGNESIUM: 2.3 MG/DL (ref 1.6–2.6)
MCH RBC QN AUTO: 33.2 PG (ref 26–35)
MCHC RBC AUTO-ENTMCNC: 33.3 % (ref 32–34.5)
MCV RBC AUTO: 99.5 FL (ref 80–99.9)
METER GLUCOSE: 175 MG/DL (ref 74–99)
METER GLUCOSE: 192 MG/DL (ref 74–99)
MONOCYTES ABSOLUTE: 0.66 E9/L (ref 0.1–0.95)
MONOCYTES RELATIVE PERCENT: 4.7 % (ref 2–12)
NEUTROPHILS ABSOLUTE: 12.72 E9/L (ref 1.8–7.3)
NEUTROPHILS RELATIVE PERCENT: 89.7 % (ref 43–80)
PDW BLD-RTO: 13.7 FL (ref 11.5–15)
PHOSPHORUS: 2.9 MG/DL (ref 2.5–4.5)
PLATELET # BLD: 204 E9/L (ref 130–450)
PMV BLD AUTO: 10.4 FL (ref 7–12)
POTASSIUM SERPL-SCNC: 4.1 MMOL/L (ref 3.5–5)
PRO-BNP: 131 PG/ML (ref 0–125)
PROCALCITONIN: 0.06 NG/ML (ref 0–0.08)
RBC # BLD: 4.16 E12/L (ref 3.8–5.8)
SEDIMENTATION RATE, ERYTHROCYTE: 9 MM/HR (ref 0–15)
SODIUM BLD-SCNC: 137 MMOL/L (ref 132–146)
TOTAL PROTEIN: 7.2 G/DL (ref 6.4–8.3)
TRIGL SERPL-MCNC: 87 MG/DL (ref 0–149)
TSH SERPL DL<=0.05 MIU/L-ACNC: 0.42 UIU/ML (ref 0.27–4.2)
URIC ACID, SERUM: 5 MG/DL (ref 3.4–7)
VITAMIN B-12: 300 PG/ML (ref 211–946)
VLDLC SERPL CALC-MCNC: 17 MG/DL
WBC # BLD: 14.2 E9/L (ref 4.5–11.5)

## 2021-08-12 PROCEDURE — 84443 ASSAY THYROID STIM HORMONE: CPT

## 2021-08-12 PROCEDURE — 93306 TTE W/DOPPLER COMPLETE: CPT

## 2021-08-12 PROCEDURE — 80048 BASIC METABOLIC PNL TOTAL CA: CPT

## 2021-08-12 PROCEDURE — 82962 GLUCOSE BLOOD TEST: CPT

## 2021-08-12 PROCEDURE — 6360000002 HC RX W HCPCS: Performed by: INTERNAL MEDICINE

## 2021-08-12 PROCEDURE — 86140 C-REACTIVE PROTEIN: CPT

## 2021-08-12 PROCEDURE — 83036 HEMOGLOBIN GLYCOSYLATED A1C: CPT

## 2021-08-12 PROCEDURE — 80061 LIPID PANEL: CPT

## 2021-08-12 PROCEDURE — 82607 VITAMIN B-12: CPT

## 2021-08-12 PROCEDURE — 85025 COMPLETE CBC W/AUTO DIFF WBC: CPT

## 2021-08-12 PROCEDURE — 2500000003 HC RX 250 WO HCPCS: Performed by: PHYSICIAN ASSISTANT

## 2021-08-12 PROCEDURE — 82746 ASSAY OF FOLIC ACID SERUM: CPT

## 2021-08-12 PROCEDURE — 84550 ASSAY OF BLOOD/URIC ACID: CPT

## 2021-08-12 PROCEDURE — 36415 COLL VENOUS BLD VENIPUNCTURE: CPT

## 2021-08-12 PROCEDURE — 84145 PROCALCITONIN (PCT): CPT

## 2021-08-12 PROCEDURE — 85651 RBC SED RATE NONAUTOMATED: CPT

## 2021-08-12 PROCEDURE — 83880 ASSAY OF NATRIURETIC PEPTIDE: CPT

## 2021-08-12 PROCEDURE — 80074 ACUTE HEPATITIS PANEL: CPT

## 2021-08-12 PROCEDURE — 2580000003 HC RX 258: Performed by: PHYSICIAN ASSISTANT

## 2021-08-12 PROCEDURE — 83735 ASSAY OF MAGNESIUM: CPT

## 2021-08-12 PROCEDURE — 84100 ASSAY OF PHOSPHORUS: CPT

## 2021-08-12 PROCEDURE — 6360000002 HC RX W HCPCS: Performed by: PHYSICIAN ASSISTANT

## 2021-08-12 PROCEDURE — 6370000000 HC RX 637 (ALT 250 FOR IP): Performed by: PHYSICIAN ASSISTANT

## 2021-08-12 PROCEDURE — 6370000000 HC RX 637 (ALT 250 FOR IP): Performed by: INTERNAL MEDICINE

## 2021-08-12 PROCEDURE — 82330 ASSAY OF CALCIUM: CPT

## 2021-08-12 PROCEDURE — 6360000004 HC RX CONTRAST MEDICATION: Performed by: INTERNAL MEDICINE

## 2021-08-12 PROCEDURE — 80076 HEPATIC FUNCTION PANEL: CPT

## 2021-08-12 RX ORDER — FAMOTIDINE 20 MG/1
20 TABLET, FILM COATED ORAL 2 TIMES DAILY
Qty: 20 TABLET | Refills: 0 | Status: SHIPPED | OUTPATIENT
Start: 2021-08-12 | End: 2021-08-22

## 2021-08-12 RX ORDER — PREDNISONE 20 MG/1
TABLET ORAL
Qty: 40 TABLET | Refills: 0 | Status: SHIPPED | OUTPATIENT
Start: 2021-08-12

## 2021-08-12 RX ORDER — AMLODIPINE BESYLATE 5 MG/1
10 TABLET ORAL DAILY
Qty: 30 TABLET | Refills: 3 | Status: SHIPPED | OUTPATIENT
Start: 2021-08-13

## 2021-08-12 RX ADMIN — FAMOTIDINE 20 MG: 10 INJECTION, SOLUTION INTRAVENOUS at 09:23

## 2021-08-12 RX ADMIN — DIPHENHYDRAMINE HYDROCHLORIDE 25 MG: 50 INJECTION, SOLUTION INTRAMUSCULAR; INTRAVENOUS at 14:39

## 2021-08-12 RX ADMIN — AMLODIPINE BESYLATE 5 MG: 5 TABLET ORAL at 09:23

## 2021-08-12 RX ADMIN — GABAPENTIN 300 MG: 300 CAPSULE ORAL at 09:23

## 2021-08-12 RX ADMIN — PERFLUTREN 1.65 MG: 6.52 INJECTION, SUSPENSION INTRAVENOUS at 10:19

## 2021-08-12 RX ADMIN — METHYLPREDNISOLONE SODIUM SUCCINATE 60 MG: 125 INJECTION, POWDER, LYOPHILIZED, FOR SOLUTION INTRAMUSCULAR; INTRAVENOUS at 14:39

## 2021-08-12 RX ADMIN — DULOXETINE 30 MG: 30 CAPSULE, DELAYED RELEASE ORAL at 09:23

## 2021-08-12 RX ADMIN — ENOXAPARIN SODIUM 40 MG: 40 INJECTION SUBCUTANEOUS at 09:23

## 2021-08-12 RX ADMIN — ASPIRIN 81 MG: 81 TABLET, CHEWABLE ORAL at 09:23

## 2021-08-12 RX ADMIN — Medication 10 ML: at 09:23

## 2021-08-12 RX ADMIN — HYDRALAZINE HYDROCHLORIDE 10 MG: 20 INJECTION INTRAMUSCULAR; INTRAVENOUS at 00:10

## 2021-08-12 RX ADMIN — TRAMADOL HYDROCHLORIDE 50 MG: 50 TABLET, FILM COATED ORAL at 06:19

## 2021-08-12 RX ADMIN — INSULIN LISPRO 1 UNITS: 100 INJECTION, SOLUTION INTRAVENOUS; SUBCUTANEOUS at 06:19

## 2021-08-12 RX ADMIN — DIPHENHYDRAMINE HYDROCHLORIDE 25 MG: 50 INJECTION, SOLUTION INTRAMUSCULAR; INTRAVENOUS at 06:26

## 2021-08-12 RX ADMIN — INSULIN LISPRO 1 UNITS: 100 INJECTION, SOLUTION INTRAVENOUS; SUBCUTANEOUS at 11:17

## 2021-08-12 RX ADMIN — METHYLPREDNISOLONE SODIUM SUCCINATE 60 MG: 125 INJECTION, POWDER, LYOPHILIZED, FOR SOLUTION INTRAMUSCULAR; INTRAVENOUS at 06:26

## 2021-08-12 RX ADMIN — GABAPENTIN 300 MG: 300 CAPSULE ORAL at 13:00

## 2021-08-12 ASSESSMENT — PAIN SCALES - GENERAL: PAINLEVEL_OUTOF10: 7

## 2021-08-12 NOTE — PROGRESS NOTES
PROGRESS  NOTE --                                                          INTERNAL  MEDICINE                                                                              I  PERSONALLY SAW , EXAMINED, AND CARED 56 Janina Eller, 8/12/2021     LABS, XRAY ,CHART, AND MEDICATIONS  REVIEWED BY ME       Chief complaint: Itching and rash      8/12/2021-SUBJECTIVE: Jesenia Lea is alert awake and cooperative; oriented ×3. Denies any chest pain dyspnea nausea emesis. Tolerating diet. No abdominal pain. Having less pruritus; feeling good. Extended discussion with him regarding hypertension, early LVH, trivial valvular problems, mild elevation of blood sugar. Afebrile last 24 hours. Blood pressure still elevated 154/74. SPO2 95 on room air. Intake and output +900 cc. Procalcitonin 0.06. Glucose 204. proBNP 131 . A1c 6.0 TSH 0.425. WBC 14.2 hemoglobin 13.8. ESR 9. Y75 folic acid normal.  CRP 0.3. Blood cultures no growth to date. SARS-CoV-2 not detected. 2D echo completed with following results--     Summary   Left ventricle grossly normal in size. Mild left ventricular concentric hypertrophy noted. Normal LV segmental wall motion. Estimated left ventricular ejection fraction is 65±5%. Does not meet 50% threshold for diastolic dysfunction. The LAESV Index is <34ml/m2. Mildly dilated right ventricle. TAPSE is normal   Physiologic and/or trace mitral regurgitation is present. Physiologic and/or trace tricuspid regurgitation. RVSP is 40 mmHg. Pulmonary hypertension is mild . Physiologic and/or trace pulmonic regurgitation present. Technically fair quality study. No comparison study available. Suggest clinical correlation.      [facility]\" has results from punch biopsy from their facility; likely drug reaction secondary to Lamictal.      Objective:     PHYSICAL EXAM:    VS: BP (!) 154/74 Pulse 98   Temp 97.6 °F (36.4 °C) (Oral)   Resp 18   Ht 6' 3\" (1.905 m)   Wt 284 lb (128.8 kg)   SpO2 95%   BMI 35.50 kg/m²     Labs:   CBC:   Lab Results   Component Value Date    WBC 14.2 08/12/2021    RBC 4.16 08/12/2021    HGB 13.8 08/12/2021    HCT 41.4 08/12/2021    MCV 99.5 08/12/2021    MCH 33.2 08/12/2021    MCHC 33.3 08/12/2021    RDW 13.7 08/12/2021     08/12/2021    MPV 10.4 08/12/2021        General appearance: Alert, Awake, Oriented times 3, no distress; mild pruritus  Skin: Warm and dry ; rash as below  Head: Normocephalic. No masses, lesions or tenderness noted  Eyes: Conjunctivae pink, sclera white. PERRL,EOM-I  Ears: External ears normal  Nose/Sinuses: Nares normal. Septum midline. Mucosa normal. No drainage  Oropharynx: Oropharynx clear with no exudate seen  Neck: Supple. No jugular venous distension, lymphadenopathy or thyromegaly Trachea midline  Lungs: Clear to auscultation bilaterally. No rhonchi, crackles or wheezes  Heart: S1 S2  Regular rate and rhythm. No rub or gallop; grade 1/6 systolic murmur third fourth left intercostal space  Abdomen: Soft, non-tender. BS normal. No masses, organomegaly; no rebound or guarding; skin biopsy site left flank  Extremities: No edema, Peripheral pulses palpable  Musculoskeletal: Muscular strength appears intact. Neuro:  No focal motor defects ; II-XII grossly intact . GUTIERREZ equally                           TELEMETRY: REVIEWED--Telemetry: Sinus    ASSESSMENT:   Principal Problem:     Allergic drug reaction  Active Problems:    BPH (benign prostatic hyperplasia)    Cervical spondylosis    Herniated thoracic disc without myelopathy    Rosacea    Psoriatic arthritis (HCC)    History of atopic dermatitis    Psoriasis    Rheumatic mitral regurgitation    Immunocompromised ([address]  Resolved Problems:    H/O: rheumatic fever      PLAN:  SEE ORDERS      RE  CHANGES AND FINDINGS   Medications reviewed with patient  GI prophylaxis  DVT prophylaxis  Med reconciliation completed  Prescriptions written  Amlodipine 10 mg daily  Metformin 500 mg daily x10 days  Prednisone 40 mg daily x3 days then 20 mg daily x3 days then 10 mg daily x3 days then stop  Continue famotidine twice daily  Diphenhydramine 25 mg 3 times daily  Follow-up PCP 1 week  Follow-up dermatology per their instructions      Discussed with patient and nursing. Jaguar King,   DO     1:01 PM     8/12/2021    TIME > 25 MINUTES    >  50 %  OF  TIME  DISCUSSION               ------------  INFORMATION  -----------      DIET:ADULT DIET; Regular        Allergies   Allergen Reactions    Simponi [Golimumab] Itching     Failed x2 despite \"premedication\". MEDICATION SIDE EFFECTS:none       SCHEDULED MEDS:                                 Scheduled Meds:   tamsulosin  0.4 mg Oral Daily    gabapentin  300 mg Oral 4x Daily    aspirin  81 mg Oral Daily    DULoxetine  30 mg Oral Daily    insulin lispro  0-6 Units Subcutaneous TID WC    insulin lispro  0-3 Units Subcutaneous Nightly    diphenhydrAMINE  25 mg Intravenous Q8H    amLODIPine  5 mg Oral Daily    methylPREDNISolone  60 mg Intravenous Q8H    famotidine (PEPCID) injection  20 mg Intravenous BID    sodium chloride flush  10 mL Intravenous 2 times per day    enoxaparin  40 mg Subcutaneous Daily       Continuous Infusions:   dextrose      sodium chloride           Data:       Intake/Output Summary (Last 24 hours) at 8/12/2021 1301  Last data filed at 8/12/2021 1253  Gross per 24 hour   Intake 360 ml   Output    Net 360 ml       Wt Readings from Last 3 Encounters:   08/12/21 284 lb (128.8 kg)       Labs: Additional    GLUCOSE:No results for input(s): POCGLU in the last 72 hours.     BNP:No results found for: BNP    CRP:   Recent Labs     08/12/21  0434   CRP 0.3       ESR:  Recent Labs     08/10/21  1958 08/12/21  0434   SEDRATE 2 9       RADIOLOGY: REVIEWED AVAILABLE REPORT  No orders to display             Jessika HODGE DO ALISHA King   1:01 PM     8/12/2021      Voice recognition software used for dictation

## 2021-08-12 NOTE — DISCHARGE SUMMARY
DISCHARGE SUMMARY  Patient ID:  Joce Hare  [id number]  5 y.o.  1955    Admit date: 8/10/2021      Discharge date : 8/12/2021      Admission Diagnoses: Rash and other nonspecific skin eruption [R21]  Allergic reaction to drug, initial encounter [T78.40XA]    Discharge Diagnosis  Principal Problem: Allergic drug reaction  Active Problems:    BPH (benign prostatic hyperplasia)    Cervical spondylosis    Herniated thoracic disc without myelopathy    Rosacea    Psoriatic arthritis (HCC)    History of atopic dermatitis    Psoriasis    Rheumatic mitral regurgitation    Immunocompromised ([address]    Pulmonary hypertension ([facility] [address]  Resolved Problems:    H/O: rheumatic fever      Hospital Course:  History and Physical        CHIEF COMPLAINT: Itching and rash     History of Present Illness: 70-year-old male patient of Dr. Srinivas Bernardo I am asked to admit and follow. History obtained from patient and wife as well as electronic record. Patient was referred to the hospital by dermatology. He states he had a routine follow-up for his facial/ labial rosacea; and he questioned dermatology regarding skin rash seen below. He had biopsy done and referred to the ED.  --Patient had arthroscopic right knee surgery 3 weeks prior to this; had tourniquet wrapped around thigh as pictured below. --He was started on Lamisil by PCP due to patient complaining of toenail difficulties approximately 3 weeks ago. Rash began on 7/23/2021. Medication was stopped. He was given a steroid shot, started on oral prednisone 10 mg daily and sent home. There was no significant relief, actually worsening until yesterday. --3 weeks prior he was working in the yard cutting blue spruce trees. Cuttings sat in the back of his truck for 2 to 3 days until he was able to remove them; significant SAP pollen and exposure to grasses and weeds as he unloaded the clippings.   --He was treated [facility] for similar rash, he does not recall any specific diagnosis; possible atopic dermatitis. There was no significant improvement with treatment, if simply had run its course  --He does get frequent skin issues, has been receiving steroid shot each spring and fall which seems to keep most of the rash under control  --Denies any significant head congestion eye itching sinus or pulmonary difficulties when rash occurs  --History of psoriatic arthritis, trial of Simponi earlier this year with adverse reaction x2 despite \"pretreatment\" before second injection. Currently on Enbrel weekly. --Patient was found to have a heart murmur 1971; underwent cardiac catheterization and was told by cardiologist \"probably had rheumatic fever\" as a child. He has had no 2D echo follow-up. --Past history negative for scarlet fever polio diphtheria cancer. --History of borderline diabetes; had been on medication a few years ago intentional weight loss all medicines were stopped for diabetes. --Began smoking age 14, approximate 1 pack a day quit 1997  --No other known cardiac pulmonary GI  difficulties. --Psoriasis for many years; psoriatic arthritis recently    8/12/2021-SUBJECTIVE: Florencio Small is alert awake and cooperative; oriented ×3. Denies any chest pain dyspnea nausea emesis. Tolerating diet. No abdominal pain. Having less pruritus; feeling good. Extended discussion with him regarding hypertension, early LVH, trivial valvular problems, mild elevation of blood sugar. Afebrile last 24 hours. Blood pressure still elevated 154/74. SPO2 95 on room air. Intake and output +900 cc. Procalcitonin 0.06. Glucose 204. proBNP 131 . A1c 6.0 TSH 0.425. WBC 14.2 hemoglobin 13.8. ESR 9. E70 folic acid normal.  CRP 0.3. Blood cultures no growth to date. SARS-CoV-2 not detected.   2D echo completed with following results--      Summary   Left ventricle grossly normal in size.   Mild left ventricular concentric hypertrophy noted.   Normal LV segmental wall motion.   Estimated left ventricular ejection fraction is 65±5%.   Does not meet 50% threshold for diastolic dysfunction.   The LAESV Index is <34ml/m2.   Mildly dilated right ventricle.   TAPSE is normal   Physiologic and/or trace mitral regurgitation is present.   Physiologic and/or trace tricuspid regurgitation.    RVSP is 40 mmHg.   Pulmonary hypertension is mild .   Physiologic and/or trace pulmonic regurgitation present.   Technically fair quality study.   No comparison study available.   Suggest clinical correlation.     [facility] has results from punch biopsy from their facility; likely drug reaction secondary to Lamictal.                          Hospital orders:  Medications discussed with patient  GI prophylaxis  DVT prophylaxis  Dermatology consult  Diphenhydramine 25 mg 3 times daily  Famotidine 20 mg IV twice daily  Sliding-scale insulin  Methylprednisolone 60 mg IV every 8 hours  Tamsulosin 0.4 mg bedtime  Monitor labs  Procalcitonin  A1c  2D echo    Instructions at discharge:    RE  CHANGES AND FINDINGS   Medications reviewed with patient  GI prophylaxis  DVT prophylaxis  Med reconciliation completed  Prescriptions written  Amlodipine 10 mg daily  Metformin 500 mg daily x10 days  Prednisone 40 mg daily x3 days then 20 mg daily x3 days then 10 mg daily x3 days then stop  Continue famotidine twice daily  Diphenhydramine 25 mg 3 times daily  Follow-up PCP 1 week  Follow-up dermatology per their instructions         Condition at DISCHARGE : Tomy 1878     Discharged to: Home    Discharge Instructions: Medications reviewed with patient    Consults:none     Past Medical Hx :   Past Medical History:   Diagnosis Date    Allergic drug reaction 08/10/2021    BPH (benign prostatic hyperplasia)     [facility]ondylosis 2016    [facility]    H/O: rheumatic fever 1964    Murmur and heart cath    Herniated thoracic disc without myelopathy 2016    [facility]; T3-4, LEFT    [facility] of [facility]opic dermatitis 2001    [facility]  Immunocompromised ([facility] [address]     Psoriasis 1995    Psoriatic arthritis ([facility] [address]     Started Simponi then Enbrel 2021    Pulmonary hypertension ([facility] 8/12/2021    Rheumatic mitral regurgitation     Rosacea        Past Surgical Hx :  Past Surgical History:   Procedure Laterality Date    BACK SURGERY  01/2021 january 2021    COLONOSCOPY  09/16/2004    KNEE ARTHROSCOPY Right 07/2021       Labs:   CBC:   Lab Results   Component Value Date    WBC 14.2 08/12/2021    RBC 4.16 08/12/2021    HGB 13.8 08/12/2021    HCT 41.4 08/12/2021    MCV 99.5 08/12/2021    MCH 33.2 08/12/2021    MCHC 33.3 08/12/2021    RDW 13.7 08/12/2021     08/12/2021    MPV 10.4 08/12/2021     CBC with Differential:    Lab Results   Component Value Date    WBC 14.2 08/12/2021    RBC 4.16 08/12/2021    HGB 13.8 08/12/2021    HCT 41.4 08/12/2021     08/12/2021    MCV 99.5 08/12/2021    MCH 33.2 08/12/2021    MCHC 33.3 08/12/2021    RDW 13.7 08/12/2021    LYMPHOPCT 4.4 08/12/2021    MONOPCT 4.7 08/12/2021    BASOPCT 0.1 08/12/2021    MONOSABS 0.66 08/12/2021    LYMPHSABS 0.62 08/12/2021    EOSABS 0.00 08/12/2021    BASOSABS 0.02 08/12/2021     Hemoglobin/Hematocrit:    Lab Results   Component Value Date    HGB 13.8 08/12/2021    HCT 41.4 08/12/2021     CMP:    Lab Results   Component Value Date     08/12/2021    K 4.1 08/12/2021    K 4.6 08/11/2021     08/12/2021    CO2 27 08/12/2021    BUN 27 08/12/2021    CREATININE 1.0 08/12/2021    GFRAA >60 08/12/2021    LABGLOM >60 08/12/2021    GLUCOSE 204 08/12/2021    PROT 7.2 08/12/2021    LABALBU 4.0 08/12/2021    CALCIUM 9.2 08/12/2021    BILITOT 0.4 08/12/2021    ALKPHOS 117 08/12/2021    AST 20 08/12/2021    ALT 28 08/12/2021     BMP:    Lab Results   Component Value Date     08/12/2021    K 4.1 08/12/2021    K 4.6 08/11/2021     08/12/2021    CO2 27 08/12/2021    BUN 27 08/12/2021    LABALBU 4.0 08/12/2021    CREATININE 1.0 08/12/2021    CALCIUM 9.2 08/12/2021 GFRAA >60 08/12/2021    LABGLOM >60 08/12/2021    GLUCOSE 204 08/12/2021     Hepatic Function Panel:    Lab Results   Component Value Date    ALKPHOS 117 08/12/2021    ALT 28 08/12/2021    AST 20 08/12/2021    PROT 7.2 08/12/2021    BILITOT 0.4 08/12/2021    BILIDIR <0.2 08/12/2021    IBILI see below 08/12/2021    LABALBU 4.0 08/12/2021     Ionized Calcium:  No results found for: IONCA  Magnesium:    Lab Results   Component Value Date    MG 2.3 08/12/2021     Phosphorus:    Lab Results   Component Value Date    PHOS 2.9 08/12/2021     LDH:  No results found for: LDH  Uric Acid:    Lab Results   Component Value Date    LABURIC 5.0 08/12/2021     PT/INR:    Lab Results   Component Value Date    PROTIME 10.2 08/10/2021    INR 0.9 08/10/2021     Warfarin PT/INR:  No components found for: PTPATWAR, PTINRWAR  PTT:  No results found for: APTT, PTT[APTT}  Troponin:  No results found for: TROPONINI  Last 3 Troponin:  No results found for: TROPONINI  U/A:    Lab Results   Component Value Date    COLORU Yellow 08/10/2021    PROTEINU Negative 08/10/2021    PHUR 5.5 08/10/2021    CLARITYU Clear 08/10/2021    SPECGRAV 1.015 08/10/2021    LEUKOCYTESUR Negative 08/10/2021    UROBILINOGEN 0.2 08/10/2021    BILIRUBINUR Negative 08/10/2021    BLOODU Negative 08/10/2021    GLUCOSEU Negative 08/10/2021     HgBA1c:    Lab Results   Component Value Date    LABA1C 6.0 08/12/2021     FLP:    Lab Results   Component Value Date    TRIG 87 08/12/2021    HDL 54 08/12/2021    LDLCALC 109 08/12/2021    LABVLDL 17 08/12/2021     TSH:    Lab Results   Component Value Date    TSH 0.425 08/12/2021     VITAMIN B12: No components found for: B12  FOLATE:    Lab Results   Component Value Date    FOLATE 9.6 08/12/2021     IRON:  No results found for: IRON  Iron Saturation:  No components found for: PERCENTFE  TIBC:  No results found for: TIBC  FERRITIN:  No results found for: FERRITIN  DIDI:  No results found for: ANATITER, DIDI       CBC:  Recent Labs 08/10/21  1958 08/11/21  0449 08/12/21  0434   WBC 8.8 7.0 14.2*   RBC 4.63 4.42 4.16   HGB 15.2 14.9 13.8   HCT 46.1 43.9 41.4    190 204   MCV 99.6 99.3 99.5   MCH 32.8 33.7 33.2   MCHC 33.0 33.9 33.3   RDW 13.4 13.4 13.7   LYMPHOPCT 14.1* 5.1* 4.4*   MONOPCT 10.1 1.1* 4.7   BASOPCT 0.5 0.3 0.1   MONOSABS 0.89 0.08* 0.66   LYMPHSABS 1.24* 0.36* 0.62*   EOSABS 0.60* 0.01* 0.00*   BASOSABS 0.04 0.02 0.02          H & H :  Recent Labs     08/10/21  1958 08/11/21  0449 08/12/21  0434   HGB 15.2 14.9 13.8       TSH:  Recent Labs     08/12/21 0434   TSH 0.425       GLUCOSE:No results for input(s): POCGLU in the last 72 hours. CMP:  Recent Labs     08/10/21  1958 08/11/21  0449 08/12/21  0434    137 137   K 4.2 4.6 4.1    101 100   CO2 29 26 27   BUN 26* 23 27*   CREATININE 1.1 1.0 1.0   GFRAA >60 >60 >60   LABGLOM >60 >60 >60   GLUCOSE 101* 264* 204*   PROT 7.1  --  7.2   LABALBU 4.1  --  4.0   CALCIUM 9.1 8.9 9.2   BILITOT 0.4  --  0.4   ALKPHOS 119  --  117   AST 22  --  20   ALT 29  --  28         BNP:No results found for: BNP    PROTIME/INR:  Recent Labs     08/10/21  1958   PROTIME 10.2   INR 0.9       CRP:   Recent Labs     08/12/21 0434   CRP 0.3       ESR:  Recent Labs     08/10/21  1958 08/12/21  0434   SEDRATE 2 9       LIPASE , AMYLASE:  Lab Results   Component Value Date    LIPASE 24 08/10/2021      No results found for: AMYLASE    ABGs: No results found for: PHART, PO2ART, JDL6OUA    CARDIAC: No results for input(s): CKTOTAL, CKMB, CKMBINDEX, TROPONINI in the last 72 hours.     Lipid Profile:   Lab Results   Component Value Date    TRIG 87 08/12/2021    HDL 54 08/12/2021    LDLCALC 109 08/12/2021    CHOL 180 08/12/2021        Echo Complete    Result Date: 8/12/2021  Transthoracic Echocardiography Report (TTE)  Demographics   Patient Name          Gamaliel Kearney  Gender                 Male   Medical Record Number [id number]    Room Number            3205   Account #             [de-identified] Procedure Date         08/12/2021   Corporate ID                      Ordering Physician     Petty [place]   Accession Number      [id number]  Referring Physician   Date of Birth         1955  Sonographer            Belia MORRISON   Age                   76 year(s)  Interpreting Physician Laquita Burgos DO                                     Any Other  Procedure Type of Study   TTE procedure:Echo Complete W/Doppler & Color Flow. Procedure Date Date: 08/12/2021 Start: 09:51 AM Study Location: Portable Technical Quality: Poor visualization due to body habitus. Indications:Mitral regurgitation. Patient Status: Routine Contrast Medium: Definity. Height: 73 inches Weight: 277 pounds BSA: 2.47 m^2 BMI: 36.55 kg/m^2 HR: 89 bpm BP: 167/74 mmHg  Findings   Left Ventricle  Left ventricle grossly normal in size. Mild left ventricular concentric hypertrophy noted. Normal LV segmental wall motion. Estimated left ventricular ejection fraction is 65±5%. Does not meet 50% threshold for diastolic dysfunction. Right Ventricle  Mildly dilated right ventricle. TAPSE is normal   Left Atrium  Left atrium is of normal size. The LAESV Index is <34ml/m2. Interatrial septum appears intact. Right Atrium  Right atrium is normal in size. Mitral Valve  Structurally normal mitral valve. Physiologic and/or trace mitral regurgitation is present. Tricuspid Valve  The tricuspid valve appears structurally normal.  Physiologic and/or trace tricuspid regurgitation. RVSP is 40 mmHg. Pulmonary hypertension is mild . Aortic Valve  The aortic valve is trileaflet. Aortic valve opens well. No doppler evidence of aortic stenosis or regurgitation. Pulmonic Valve  Pulmonic valve is structurally normal.  Physiologic and/or trace pulmonic regurgitation present. Pericardial Effusion  No evidence of pericardial thickening/calcification present. No evidence of pericardial effusion. Aorta  Aortic root dimension within normal limits. Ascending aorta appears mildly sclerotic and/or calcified. Miscellaneous  Normal Inferior Vena Cava diameter and respiratory variation. Conclusions   Summary  Left ventricle grossly normal in size. Mild left ventricular concentric hypertrophy noted. Normal LV segmental wall motion. Estimated left ventricular ejection fraction is 65±5%. Does not meet 50% threshold for diastolic dysfunction. The LAESV Index is <34ml/m2. Mildly dilated right ventricle. TAPSE is normal  Physiologic and/or trace mitral regurgitation is present. Physiologic and/or trace tricuspid regurgitation. RVSP is 40 mmHg. Pulmonary hypertension is mild . Physiologic and/or trace pulmonic regurgitation present. Technically fair quality study. No comparison study available. Suggest clinical correlation.    Signature   ----------------------------------------------------------------  Electronically signed by Larisa Webb DO(Interpreting  physician) on 08/12/2021 11:52 AM  ----------------------------------------------------------------  M-Mode/2D Measurements & Calculations   LV Diastolic    LV Systolic Dimension: 3.4   AV Cusp Separation: 1.8 cmLA  Dimension: 4.7  cm                           Dimension: 3.8 cmAO Root  cm              LV Volume Diastolic: 139.5   Dimension: 2.3 cm  LV FS:27.7 %    ml  LV PW           LV Volume Systolic: 52.7 ml  Diastolic: 1.4  LV EDV/LV EDV Index: 103.9  cm              ml/42 ml/m^2LV ESV/LV ESV    RV Diastolic Dimension: 3.5  LV PW Systolic: Index: 22.4 YO/91TD/ m^2     cm  1.6 cm          EF Calculated: 55.5 %  Septum          LV Mass Index: 107 l/min*m^2 LA/Aorta: 7.36  Diastolic: 1.4                               Ascending Aorta: 2.9 cm  cm                                           LA volume/Index: 80 ml  Septum          LVOT: 2 cm                   /29PU/T^6  Systolic: 1.5                                RA Area: 22.3 cm^2  cm  CO: 6.57 l/min                               IVC Expiration: 1.4 cm CI: 2.66  l/m*m^2  LV Mass: 265.22  g  Doppler Measurements & Calculations   MV Peak E-Wave:   AV Peak Velocity: 1.53 m/s    LVOT Peak Velocity: 1.14  0.83 m/s          AV Peak Gradient: 9.33 mmHg   m/s  MV Peak A-Wave:   AV Mean Velocity: 1.21 m/s    LVOT Mean Velocity: 0.91  1.03 m/s          AV Mean Gradient: 6.2 mmHg    m/s  MV E/A Ratio:     AV VTI: 33.5 cm               LVOT Peak Gradient: 5.2  0.81              AV Area (Continuity):2.2 cm^2 mmHgLVOT Mean Gradient:  MV Peak Gradient:                               3.5 mmHg  4.3 mmHg          LVOT VTI: 23.5 cm             Estimated RVSP: 39.9 mmHg  MV Mean Gradient: IVRT: 83 msec                 Estimated RAP:3 mmHg  2.3 mmHg          Estimated PASP: 39.94 mmHg  MV Mean Velocity: Pulm. Vein A Reversal  0.71 m/s          Duration:106.1 msec           TR Velocity:3.04 m/s  MV Deceleration   Pulm. Vein D Velocity:0.39    TR Gradient:36.94 mmHg  Time: 229.6 msec  m/sPulm. Vein A Reversal      PV Peak Velocity: 1.52 m/s  MV P1/2t: 64.2    Velocity:0.23 m/s             PV Peak Gradient: 9.19  msec              Pulm. Vein S Velocity: 0.58   mmHg  MVA by PHT:3.43   m/s                           PV Mean Velocity: 1.08 m/s  cm^2                                            PV Mean Gradient: 5.2 mmHg  MV Area  (continuity): 3.3  cm^2  MV E' Septal  Velocity: 0.08  m/s  MV E' Lateral  Velocity: 9 m/s  [id number]      Discharge Exam:  See progress note from today    Current Discharge Medication List      START taking these medications    Details   famotidine (PEPCID) 20 MG tablet Take 1 tablet by mouth 2 times daily for 10 days  Qty: 20 tablet, Refills: 0      amLODIPine (NORVASC) 5 MG tablet Take 2 tablets by mouth daily  Qty: 30 tablet, Refills: 3      metFORMIN (GLUCOPHAGE) 500 MG tablet Take 1 tablet by mouth daily (with breakfast) for 10 days  Qty: 10 tablet, Refills: 0      predniSONE (DELTASONE) 20 MG tablet 40 mg daily for 3 days then 20 mg daily for 3 days then 10 mg daily for 3 days then stop  Qty: 40 tablet, Refills: 0         CONTINUE these medications which have NOT CHANGED    Details   tamsulosin (FLOMAX) 0.4 MG capsule Take 0.4 mg by mouth nightly      gabapentin (NEURONTIN) 300 MG capsule Take 300 mg by mouth 4 times daily. DULoxetine (CYMBALTA) 30 MG extended release capsule Take 30 mg by mouth daily      traMADol (ULTRAM) 50 MG tablet Take 50 mg by mouth every 6 hours as needed for Pain. aspirin 81 MG chewable tablet Take 81 mg by mouth daily      ibuprofen (ADVIL;MOTRIN) 200 MG tablet Take 200 mg by mouth every 6 hours as needed for Pain      etanercept (ENBREL) 50 MG/ML injection Inject 25 mg into the skin Twice a Week ON FRIDAYS             Time Spent on discharge is more than 30 minutes --      TIME  INCLUDES TIME THAT WAS  SPENT WITH DISCHARGE PAPERS, MEDICATION REVIEW, MEDICATION RECONCILIATION,   PRESCRIPTIONS, CHART REVIEW, PATIENT EXAM , FINAL PROGRESS NOTE, DISCUSSION OF FINDINGS WITH PATIENT AND AVAILABLE FAMILY , AND DICTATION  WHERE NEEDED ; Home Health Care [facility]) FORMS COMPLETED ; N-17  COMPLETION ;  H&P UPDATED ; DURABLE EQUIPMENT FORMS.      Active Hospital Problems    Diagnosis     Pulmonary hypertension ([address] [I27.20]     Immunocompromised ([address] [D84.9]     BPH (benign prostatic hyperplasia) [N40.0]     Rosacea [L71.9]     [facility] [L40.50]     Rheumatic mitral regurgitation [I05.1]     Allergic drug reaction [T78.40XA]     Cervical spondylosis [M47.812]     Herniated thoracic disc without myelopathy [M51.24]     History of atopic dermatitis [Z87.2]     Psoriasis [L40.9]        Signed:    Ami Ambrocio DO DO    8/12/2021    2:59 PM    Voice recognition software use for dictation

## 2021-08-12 NOTE — PROGRESS NOTES
Spoke with [facility] regarding punch biopsy results from their facility which resulted in drug reaction to [address] updated.

## 2021-08-13 LAB
HAV IGM SER IA-ACNC: NORMAL
HEPATITIS B CORE IGM ANTIBODY: NORMAL
HEPATITIS B SURFACE ANTIGEN INTERPRETATION: NORMAL
HEPATITIS C ANTIBODY INTERPRETATION: NORMAL
URINE CULTURE, ROUTINE: NORMAL

## 2021-08-15 LAB
BLOOD CULTURE, ROUTINE: NORMAL
CULTURE, BLOOD 2: NORMAL

## 2021-12-03 ENCOUNTER — HOSPITAL ENCOUNTER (OUTPATIENT)
Age: 66
Discharge: HOME | End: 2021-12-05

## 2021-12-03 PROCEDURE — 88305 TISSUE EXAM BY PATHOLOGIST: CPT

## 2022-10-10 ENCOUNTER — HOSPITAL ENCOUNTER (OUTPATIENT)
Age: 67
Discharge: HOME | End: 2022-10-12

## 2022-10-10 LAB
ABO/RH: NORMAL
ANTIBODY SCREEN: NORMAL
APTT: 29.3 SEC (ref 24.5–35.1)
BASOPHILS ABSOLUTE: 0.05 E9/L (ref 0–0.2)
BASOPHILS RELATIVE PERCENT: 0.7 % (ref 0–2)
EOSINOPHILS ABSOLUTE: 0.1 E9/L (ref 0.05–0.5)
EOSINOPHILS RELATIVE PERCENT: 1.5 % (ref 0–6)
HCT VFR BLD CALC: 41.1 % (ref 37–54)
HEMOGLOBIN: 15 G/DL (ref 12.5–16.5)
IMMATURE GRANULOCYTES #: 0.02 E9/L
IMMATURE GRANULOCYTES %: 0.3 % (ref 0–5)
INR BLD: 1.1
LYMPHOCYTES ABSOLUTE: 1.35 E9/L (ref 1.5–4)
LYMPHOCYTES RELATIVE PERCENT: 19.7 % (ref 20–42)
MCH RBC QN AUTO: 36.1 PG (ref 26–35)
MCHC RBC AUTO-ENTMCNC: 36.5 % (ref 32–34.5)
MCV RBC AUTO: 98.8 FL (ref 80–99.9)
MONOCYTES ABSOLUTE: 0.75 E9/L (ref 0.1–0.95)
MONOCYTES RELATIVE PERCENT: 10.9 % (ref 2–12)
NEUTROPHILS ABSOLUTE: 4.58 E9/L (ref 1.8–7.3)
NEUTROPHILS RELATIVE PERCENT: 66.9 % (ref 43–80)
PDW BLD-RTO: 12.7 FL (ref 11.5–15)
PLATELET # BLD: 184 E9/L (ref 130–450)
PMV BLD AUTO: 10.2 FL (ref 7–12)
PROTHROMBIN TIME: 12 SEC (ref 9.3–12.4)
RBC # BLD: 4.16 E12/L (ref 3.8–5.8)
WBC # BLD: 6.9 E9/L (ref 4.5–11.5)

## 2022-10-10 PROCEDURE — 86900 BLOOD TYPING SEROLOGIC ABO: CPT

## 2022-10-10 PROCEDURE — 86901 BLOOD TYPING SEROLOGIC RH(D): CPT

## 2022-10-10 PROCEDURE — 86850 RBC ANTIBODY SCREEN: CPT

## 2022-10-10 PROCEDURE — 85025 COMPLETE CBC W/AUTO DIFF WBC: CPT

## 2022-10-10 PROCEDURE — 85610 PROTHROMBIN TIME: CPT

## 2022-10-10 PROCEDURE — 85730 THROMBOPLASTIN TIME PARTIAL: CPT

## 2022-10-21 ENCOUNTER — HOSPITAL ENCOUNTER (OUTPATIENT)
Age: 67
Discharge: HOME | End: 2022-10-23

## 2022-10-21 LAB
ANION GAP SERPL CALCULATED.3IONS-SCNC: 11 MMOL/L (ref 7–16)
BUN BLDV-MCNC: 21 MG/DL (ref 6–23)
CALCIUM SERPL-MCNC: 8.6 MG/DL (ref 8.6–10.2)
CHLORIDE BLD-SCNC: 100 MMOL/L (ref 98–107)
CO2: 24 MMOL/L (ref 22–29)
CREAT SERPL-MCNC: 1 MG/DL (ref 0.7–1.2)
GFR SERPL CREATININE-BSD FRML MDRD: >60 ML/MIN/1.73
GLUCOSE BLD-MCNC: 156 MG/DL (ref 74–99)
HCT VFR BLD CALC: 33.2 % (ref 37–54)
HEMOGLOBIN: 11.9 G/DL (ref 12.5–16.5)
MCH RBC QN AUTO: 36.6 PG (ref 26–35)
MCHC RBC AUTO-ENTMCNC: 35.8 % (ref 32–34.5)
MCV RBC AUTO: 102.2 FL (ref 80–99.9)
PDW BLD-RTO: 13.4 FL (ref 11.5–15)
PLATELET # BLD: 154 E9/L (ref 130–450)
PMV BLD AUTO: 10.3 FL (ref 7–12)
POTASSIUM SERPL-SCNC: 3.9 MMOL/L (ref 3.5–5)
RBC # BLD: 3.25 E12/L (ref 3.8–5.8)
SODIUM BLD-SCNC: 135 MMOL/L (ref 132–146)
WBC # BLD: 10.7 E9/L (ref 4.5–11.5)

## 2022-10-21 PROCEDURE — 80048 BASIC METABOLIC PNL TOTAL CA: CPT

## 2022-10-21 PROCEDURE — 85027 COMPLETE CBC AUTOMATED: CPT

## 2024-12-27 ENCOUNTER — HOSPITAL ENCOUNTER (OUTPATIENT)
Age: 69
Discharge: HOME | End: 2024-12-29

## 2024-12-27 PROCEDURE — 88342 IMHCHEM/IMCYTCHM 1ST ANTB: CPT

## 2024-12-27 PROCEDURE — 88305 TISSUE EXAM BY PATHOLOGIST: CPT

## 2025-01-03 LAB — SURGICAL PATHOLOGY REPORT: NORMAL

## 2025-01-06 ENCOUNTER — HOSPITAL ENCOUNTER (OUTPATIENT)
Age: 70
Discharge: HOME | End: 2025-01-08

## 2025-01-06 LAB
ABO + RH BLD: NORMAL
ANION GAP SERPL CALCULATED.3IONS-SCNC: 9 MMOL/L (ref 7–16)
ARM BAND NUMBER: NORMAL
BASOPHILS # BLD: 0.06 K/UL (ref 0–0.2)
BASOPHILS NFR BLD: 1 % (ref 0–2)
BLOOD BANK SAMPLE EXPIRATION: NORMAL
BLOOD GROUP ANTIBODIES SERPL: NEGATIVE
BUN SERPL-MCNC: 22 MG/DL (ref 6–23)
CALCIUM SERPL-MCNC: 9.4 MG/DL (ref 8.6–10.2)
CHLORIDE SERPL-SCNC: 102 MMOL/L (ref 98–107)
CHOLEST SERPL-MCNC: 164 MG/DL
CO2 SERPL-SCNC: 27 MMOL/L (ref 22–29)
CREAT SERPL-MCNC: 1.3 MG/DL (ref 0.7–1.2)
EOSINOPHIL # BLD: 0.19 K/UL (ref 0.05–0.5)
EOSINOPHILS RELATIVE PERCENT: 3 % (ref 0–6)
ERYTHROCYTE [DISTWIDTH] IN BLOOD BY AUTOMATED COUNT: 12.7 % (ref 11.5–15)
GFR, ESTIMATED: 58 ML/MIN/1.73M2
GLUCOSE SERPL-MCNC: 95 MG/DL (ref 74–99)
HCT VFR BLD AUTO: 39.3 % (ref 37–54)
HDLC SERPL-MCNC: 54 MG/DL
HGB BLD-MCNC: 13.6 G/DL (ref 12.5–16.5)
IMM GRANULOCYTES # BLD AUTO: <0.03 K/UL (ref 0–0.58)
IMM GRANULOCYTES NFR BLD: 0 % (ref 0–5)
INR PPP: 1.1
LDLC SERPL CALC-MCNC: 96 MG/DL
LYMPHOCYTES NFR BLD: 1.15 K/UL (ref 1.5–4)
LYMPHOCYTES RELATIVE PERCENT: 18 % (ref 20–42)
MCH RBC QN AUTO: 34.4 PG (ref 26–35)
MCHC RBC AUTO-ENTMCNC: 34.6 G/DL (ref 32–34.5)
MCV RBC AUTO: 99.5 FL (ref 80–99.9)
MONOCYTES NFR BLD: 0.63 K/UL (ref 0.1–0.95)
MONOCYTES NFR BLD: 10 % (ref 2–12)
NEUTROPHILS NFR BLD: 67 % (ref 43–80)
NEUTS SEG NFR BLD: 4.22 K/UL (ref 1.8–7.3)
PARTIAL THROMBOPLASTIN TIME: 28.6 SEC (ref 24.5–35.1)
PLATELET # BLD AUTO: 179 K/UL (ref 130–450)
PMV BLD AUTO: 10.4 FL (ref 7–12)
POTASSIUM SERPL-SCNC: 4.4 MMOL/L (ref 3.5–5)
PROTHROMBIN TIME: 11.4 SEC (ref 9.3–12.4)
PSA SERPL-MCNC: 2.03 NG/ML (ref 0–4)
RBC # BLD AUTO: 3.95 M/UL (ref 3.8–5.8)
SODIUM SERPL-SCNC: 138 MMOL/L (ref 132–146)
TRIGL SERPL-MCNC: 70 MG/DL
VLDLC SERPL CALC-MCNC: 14 MG/DL
WBC OTHER # BLD: 6.3 K/UL (ref 4.5–11.5)

## 2025-01-06 PROCEDURE — 86901 BLOOD TYPING SEROLOGIC RH(D): CPT

## 2025-01-06 PROCEDURE — 85610 PROTHROMBIN TIME: CPT

## 2025-01-06 PROCEDURE — 85025 COMPLETE CBC W/AUTO DIFF WBC: CPT

## 2025-01-06 PROCEDURE — 85730 THROMBOPLASTIN TIME PARTIAL: CPT

## 2025-01-06 PROCEDURE — 87081 CULTURE SCREEN ONLY: CPT

## 2025-01-06 PROCEDURE — 86900 BLOOD TYPING SEROLOGIC ABO: CPT

## 2025-01-06 PROCEDURE — 86850 RBC ANTIBODY SCREEN: CPT

## 2025-01-06 PROCEDURE — G0103 PSA SCREENING: HCPCS

## 2025-01-06 PROCEDURE — 80061 LIPID PANEL: CPT

## 2025-01-06 PROCEDURE — 80048 BASIC METABOLIC PNL TOTAL CA: CPT

## 2025-01-08 LAB
MICROORGANISM SPEC CULT: NORMAL
SPECIMEN DESCRIPTION: NORMAL

## 2025-01-13 ENCOUNTER — HOSPITAL ENCOUNTER (OUTPATIENT)
Age: 70
Discharge: HOME | End: 2025-01-15

## 2025-01-13 PROCEDURE — 88305 TISSUE EXAM BY PATHOLOGIST: CPT

## 2025-01-13 PROCEDURE — 88311 DECALCIFY TISSUE: CPT

## 2025-01-14 ENCOUNTER — HOSPITAL ENCOUNTER (OUTPATIENT)
Age: 70
Discharge: HOME | End: 2025-01-16

## 2025-01-14 LAB
ANION GAP SERPL CALCULATED.3IONS-SCNC: 12 MMOL/L (ref 7–16)
BUN SERPL-MCNC: 21 MG/DL (ref 6–23)
CALCIUM SERPL-MCNC: 8.8 MG/DL (ref 8.6–10.2)
CHLORIDE SERPL-SCNC: 101 MMOL/L (ref 98–107)
CO2 SERPL-SCNC: 23 MMOL/L (ref 22–29)
CREAT SERPL-MCNC: 1.2 MG/DL (ref 0.7–1.2)
ERYTHROCYTE [DISTWIDTH] IN BLOOD BY AUTOMATED COUNT: 12.5 % (ref 11.5–15)
GFR, ESTIMATED: 64 ML/MIN/1.73M2
GLUCOSE SERPL-MCNC: 240 MG/DL (ref 74–99)
HCT VFR BLD AUTO: 36.8 % (ref 37–54)
HGB BLD-MCNC: 12.7 G/DL (ref 12.5–16.5)
MCH RBC QN AUTO: 35 PG (ref 26–35)
MCHC RBC AUTO-ENTMCNC: 34.5 G/DL (ref 32–34.5)
MCV RBC AUTO: 101.4 FL (ref 80–99.9)
PLATELET # BLD AUTO: 200 K/UL (ref 130–450)
PMV BLD AUTO: 10.7 FL (ref 7–12)
POTASSIUM SERPL-SCNC: 4.4 MMOL/L (ref 3.5–5)
RBC # BLD AUTO: 3.63 M/UL (ref 3.8–5.8)
SODIUM SERPL-SCNC: 136 MMOL/L (ref 132–146)
WBC OTHER # BLD: 12.1 K/UL (ref 4.5–11.5)

## 2025-01-14 PROCEDURE — 80048 BASIC METABOLIC PNL TOTAL CA: CPT

## 2025-01-14 PROCEDURE — 85027 COMPLETE CBC AUTOMATED: CPT

## 2025-01-17 LAB — SURGICAL PATHOLOGY REPORT: NORMAL

## 2025-06-13 ENCOUNTER — HOSPITAL ENCOUNTER (OUTPATIENT)
Age: 70
Discharge: HOME | End: 2025-06-15

## 2025-06-13 PROCEDURE — 88305 TISSUE EXAM BY PATHOLOGIST: CPT

## 2025-06-20 LAB — SURGICAL PATHOLOGY REPORT: NORMAL

## 2025-08-20 ENCOUNTER — TRANSCRIBE ORDERS (OUTPATIENT)
Dept: ADMINISTRATIVE | Age: 70
End: 2025-08-20

## 2025-08-20 DIAGNOSIS — M54.16 LUMBAR RADICULOPATHY: Primary | ICD-10-CM

## 2025-08-20 DIAGNOSIS — M51.27 OTHER INTERVERTEBRAL DISC DISPLACEMENT, LUMBOSACRAL REGION: ICD-10-CM
